# Patient Record
Sex: MALE | Race: BLACK OR AFRICAN AMERICAN | Employment: OTHER | URBAN - METROPOLITAN AREA
[De-identification: names, ages, dates, MRNs, and addresses within clinical notes are randomized per-mention and may not be internally consistent; named-entity substitution may affect disease eponyms.]

---

## 2017-07-10 ENCOUNTER — HOSPITAL ENCOUNTER (OUTPATIENT)
Age: 77
Discharge: HOME | End: 2017-07-10
Payer: MEDICARE

## 2017-07-10 VITALS
OXYGEN SATURATION: 100 % | TEMPERATURE: 98 F | WEIGHT: 170 LBS | RESPIRATION RATE: 18 BRPM | HEART RATE: 74 BPM | DIASTOLIC BLOOD PRESSURE: 62 MMHG | BODY MASS INDEX: 24.34 KG/M2 | SYSTOLIC BLOOD PRESSURE: 152 MMHG | HEIGHT: 70 IN

## 2017-07-10 DIAGNOSIS — J02.9 PHARYNGITIS, UNSPECIFIED ETIOLOGY: Primary | ICD-10-CM

## 2017-07-10 DIAGNOSIS — K13.79 LESION OF SOFT PALATE: ICD-10-CM

## 2017-07-10 LAB — POCT RAPID STREP: NEGATIVE

## 2017-07-10 PROCEDURE — 99214 OFFICE O/P EST MOD 30 MIN: CPT

## 2017-07-10 PROCEDURE — 87081 CULTURE SCREEN ONLY: CPT | Performed by: PHYSICIAN ASSISTANT

## 2017-07-10 PROCEDURE — 87430 STREP A AG IA: CPT | Performed by: PHYSICIAN ASSISTANT

## 2017-07-10 RX ORDER — DEXAMETHASONE SODIUM PHOSPHATE 4 MG/ML
8 VIAL (ML) INJECTION ONCE
Status: COMPLETED | OUTPATIENT
Start: 2017-07-10 | End: 2017-07-10

## 2017-07-10 NOTE — ED PROVIDER NOTES
Patient Seen in: [facility]    History   No chief complaint on file. Stated Complaint: 4 DAYS SORE THROAT    HPI    Elieser Bragg is a 64-year-old gentleman who presents today for evaluation of sore throat.   He has past medical hist other systems reviewed and negative except as noted above. PSFH elements reviewed from today and agreed except as otherwise stated in HPI.     Physical Exam   ED Triage Vitals [07/10/17 1548]  BP: 152/62  Pulse: 74  Resp: 18  Temp: 97.7 °F (36.5 °C)  Tem throat and some mild hoarseness. His symptoms have been acute in onset without any other constitutional symptoms. On exam, he is well-appearing, no respiratory distress.   Posterior pharynx is mildly erythematous, but there is a large, irregularly-shaped below.     Jona LEDESMA

## 2017-10-17 ENCOUNTER — LAB ENCOUNTER (OUTPATIENT)
Dept: LAB | Age: 77
End: 2017-10-17
Attending: UROLOGY
Payer: MEDICARE

## 2017-10-17 DIAGNOSIS — R97.20 ELEVATED PSA: ICD-10-CM

## 2017-10-17 PROCEDURE — 84154 ASSAY OF PSA FREE: CPT

## 2017-10-17 PROCEDURE — 84153 ASSAY OF PSA TOTAL: CPT

## 2017-10-17 PROCEDURE — 36415 COLL VENOUS BLD VENIPUNCTURE: CPT

## 2018-01-16 PROBLEM — R79.89 LOW TSH LEVEL: Status: ACTIVE | Noted: 2018-01-16

## 2018-01-16 PROBLEM — E11.9 TYPE 2 DIABETES MELLITUS WITHOUT COMPLICATION, WITHOUT LONG-TERM CURRENT USE OF INSULIN (HCC): Status: ACTIVE | Noted: 2018-01-16

## 2018-01-16 PROBLEM — N18.9 CHRONIC RENAL IMPAIRMENT: Status: ACTIVE | Noted: 2018-01-16

## 2018-01-16 PROBLEM — R97.20 ELEVATED PSA: Status: ACTIVE | Noted: 2018-01-16

## 2018-01-16 PROBLEM — D64.9 ANEMIA: Status: ACTIVE | Noted: 2018-01-16

## 2018-01-16 PROBLEM — R63.4 WEIGHT LOSS, ABNORMAL: Status: ACTIVE | Noted: 2018-01-16

## 2018-01-16 PROBLEM — Z86.010 PERSONAL HISTORY OF COLONIC POLYPS: Status: ACTIVE | Noted: 2018-01-16

## 2018-05-18 ENCOUNTER — LABORATORY ENCOUNTER (OUTPATIENT)
Dept: LAB | Age: 78
End: 2018-05-18
Payer: MEDICARE

## 2018-05-18 DIAGNOSIS — K55.20 ANGIODYSPLASIA: ICD-10-CM

## 2018-05-18 DIAGNOSIS — D64.9 ANEMIA, UNSPECIFIED TYPE: ICD-10-CM

## 2018-05-18 DIAGNOSIS — D50.9 IRON DEFICIENCY ANEMIA, UNSPECIFIED IRON DEFICIENCY ANEMIA TYPE: ICD-10-CM

## 2018-05-18 DIAGNOSIS — R63.4 ABNORMAL WEIGHT LOSS: ICD-10-CM

## 2018-05-18 PROCEDURE — 80048 BASIC METABOLIC PNL TOTAL CA: CPT

## 2018-05-18 PROCEDURE — 36415 COLL VENOUS BLD VENIPUNCTURE: CPT

## 2018-05-18 PROCEDURE — 85025 COMPLETE CBC W/AUTO DIFF WBC: CPT

## 2018-07-03 ENCOUNTER — ANESTHESIA (OUTPATIENT)
Dept: ENDOSCOPY | Facility: HOSPITAL | Age: 78
End: 2018-07-03
Payer: MEDICARE

## 2018-07-03 ENCOUNTER — SURGERY (OUTPATIENT)
Age: 78
End: 2018-07-03

## 2018-07-03 ENCOUNTER — HOSPITAL ENCOUNTER (OUTPATIENT)
Facility: HOSPITAL | Age: 78
Setting detail: HOSPITAL OUTPATIENT SURGERY
Discharge: HOME | End: 2018-07-03
Attending: INTERNAL MEDICINE | Admitting: INTERNAL MEDICINE
Payer: MEDICARE

## 2018-07-03 ENCOUNTER — ANESTHESIA EVENT (OUTPATIENT)
Dept: ENDOSCOPY | Facility: HOSPITAL | Age: 78
End: 2018-07-03
Payer: MEDICARE

## 2018-07-03 VITALS
RESPIRATION RATE: 18 BRPM | DIASTOLIC BLOOD PRESSURE: 61 MMHG | HEART RATE: 74 BPM | HEIGHT: 69 IN | SYSTOLIC BLOOD PRESSURE: 154 MMHG | WEIGHT: 165 LBS | OXYGEN SATURATION: 100 % | BODY MASS INDEX: 24.44 KG/M2 | TEMPERATURE: 98 F

## 2018-07-03 DIAGNOSIS — K55.20 ANGIODYSPLASIA: ICD-10-CM

## 2018-07-03 LAB — GLUCOSE BLD-MCNC: 110 MG/DL (ref 65–99)

## 2018-07-03 PROCEDURE — 82962 GLUCOSE BLOOD TEST: CPT

## 2018-07-03 PROCEDURE — 0DJ08ZZ INSPECTION OF UPPER INTESTINAL TRACT, VIA NATURAL OR ARTIFICIAL OPENING ENDOSCOPIC: ICD-10-PCS | Performed by: INTERNAL MEDICINE

## 2018-07-03 RX ORDER — SODIUM CHLORIDE, SODIUM LACTATE, POTASSIUM CHLORIDE, CALCIUM CHLORIDE 600; 310; 30; 20 MG/100ML; MG/100ML; MG/100ML; MG/100ML
INJECTION, SOLUTION INTRAVENOUS CONTINUOUS
Status: DISCONTINUED | OUTPATIENT
Start: 2018-07-03 | End: 2018-07-03

## 2018-07-03 RX ORDER — DEXTROSE MONOHYDRATE 25 G/50ML
50 INJECTION, SOLUTION INTRAVENOUS
Status: DISCONTINUED | OUTPATIENT
Start: 2018-07-03 | End: 2018-07-03

## 2018-07-03 NOTE — OPERATIVE REPORT
Edith Fredericvikram Patient Status:  Hospital Outpatient Surgery    1940 MRN [id number]   [facility] ENDOSCOPY Attending Raquel Crowley MD   Hosp Day # 0 PCP Isha Huntley MD     PREOPERATIVE DIAGNOSIS/INDICATION: H/o gastroduodenal

## 2018-07-03 NOTE — ANESTHESIA POSTPROCEDURE EVALUATION
2505 [facility] Patient Status:  Hospital Outpatient Surgery   Age/Gender [de-identified]year old male MRN [id number]   Location [address] [facility]. Attending Nestor Rice MD   Hosp Day # 0 PCP Kunal Agrawal MD       Anesthesia Post-op

## 2018-07-03 NOTE — ANESTHESIA PREPROCEDURE EVALUATION
PRE-OP EVALUATION    Patient Name: Ellie Kirkpatrick    Pre-op Diagnosis: Angiodysplasia [K55.20]    Procedure(s):  ESOPHAGOGASTRODUODENOSCOPY    Surgeon(s) and Role:     Dominique Tavarez MD - Primary    Pre-op vitals reviewed.   Temp: 98 °F (36.7 °C)  Puls GI/Hepatic/Renal      (+) GERD                           Cardiovascular                  (+) hypertension                                     Endo/Other      (+) diabetes         (+) anemia                   Pulmonary                           Neuro/Psych

## 2018-07-03 NOTE — H&P
[id number] Radha Lay Patient Status:  Hospital Outpatient Surgery    1940 MRN [id number]   [facility] ENDOSCOPY Attending Sp Eason MD   Hosp Day # 0 PCP Cristiano Wright MD     CC: stomach and duod source Oral, resp. rate 20, height 69\", weight 165 lb, SpO2 100 %. General: Appears alert, oriented x3 and in no acute distress. HEENT: Normal.  CV: S1 and S2 normal.    Lungs: Clear to auscultation. Abdomen: Soft and nondistended. Nontender.   No ma

## 2018-09-27 ENCOUNTER — PRIOR ORIGINAL RECORDS (OUTPATIENT)
Dept: OTHER | Age: 78
End: 2018-09-27

## 2018-10-12 ENCOUNTER — HOSPITAL ENCOUNTER (EMERGENCY)
Age: 78
Discharge: STILL ADMITTED OR ADMINISTRATIVE | End: 2018-10-12
Payer: MEDICARE

## 2018-10-12 ENCOUNTER — HOSPITAL ENCOUNTER (OUTPATIENT)
Dept: CT IMAGING | Age: 78
Discharge: HOME | End: 2018-10-12
Attending: SPECIALIST
Payer: MEDICARE

## 2018-10-12 DIAGNOSIS — R63.0 LOSS OF APPETITE: ICD-10-CM

## 2018-10-12 DIAGNOSIS — R63.4 WEIGHT LOSS: ICD-10-CM

## 2018-10-12 DIAGNOSIS — R52 PAIN: ICD-10-CM

## 2018-10-12 PROCEDURE — 74176 CT ABD & PELVIS W/O CONTRAST: CPT | Performed by: SPECIALIST

## 2018-10-19 ENCOUNTER — TELEPHONE (OUTPATIENT)
Dept: SURGERY | Facility: CLINIC | Age: 78
End: 2018-10-19

## 2018-10-19 NOTE — TELEPHONE ENCOUNTER
Called pt to review HX prior to appt on Monday 10/22/18; was told pt is unavailable and to call back later. Called pt again; pt is currently driving and will be for a while. Advised we would call him back on Monday morning.   Pt states he will be avai

## 2018-10-22 ENCOUNTER — OFFICE VISIT (OUTPATIENT)
Dept: SURGERY | Facility: CLINIC | Age: 78
End: 2018-10-22
Payer: COMMERCIAL

## 2018-10-22 ENCOUNTER — NURSE ONLY (OUTPATIENT)
Dept: HEMATOLOGY/ONCOLOGY | Facility: HOSPITAL | Age: 78
End: 2018-10-22
Attending: SURGERY
Payer: MEDICARE

## 2018-10-22 VITALS
TEMPERATURE: 98 F | DIASTOLIC BLOOD PRESSURE: 75 MMHG | BODY MASS INDEX: 23.11 KG/M2 | WEIGHT: 156 LBS | HEART RATE: 88 BPM | RESPIRATION RATE: 17 BRPM | HEIGHT: 69 IN | SYSTOLIC BLOOD PRESSURE: 163 MMHG | OXYGEN SATURATION: 100 %

## 2018-10-22 DIAGNOSIS — K86.89 PANCREATIC MASS: Primary | ICD-10-CM

## 2018-10-22 DIAGNOSIS — K86.89 PANCREATIC MASS: ICD-10-CM

## 2018-10-22 PROCEDURE — 80053 COMPREHEN METABOLIC PANEL: CPT

## 2018-10-22 PROCEDURE — 36415 COLL VENOUS BLD VENIPUNCTURE: CPT

## 2018-10-22 PROCEDURE — 86301 IMMUNOASSAY TUMOR CA 19-9: CPT

## 2018-10-22 PROCEDURE — 99205 OFFICE O/P NEW HI 60 MIN: CPT | Performed by: SURGERY

## 2018-10-22 PROCEDURE — 85025 COMPLETE CBC W/AUTO DIFF WBC: CPT

## 2018-10-22 RX ORDER — SITAGLIPTIN 100 MG/1
TABLET, FILM COATED ORAL
Refills: 0 | COMMUNITY
Start: 2018-10-20 | End: 2020-02-15 | Stop reason: CLARIF

## 2018-10-22 RX ORDER — SITAGLIPTIN AND METFORMIN HYDROCHLORIDE 50; 1000 MG/1; MG/1
TABLET, FILM COATED ORAL
Refills: 0 | COMMUNITY
Start: 2018-10-01 | End: 2020-02-15 | Stop reason: ALTCHOICE

## 2018-10-22 RX ORDER — GLIPIZIDE 10 MG/1
TABLET ORAL
Refills: 0 | COMMUNITY
Start: 2018-09-11 | End: 2019-03-15

## 2018-10-22 RX ORDER — HYDROXYZINE HYDROCHLORIDE 10 MG/1
TABLET, FILM COATED ORAL
Refills: 0 | COMMUNITY
Start: 2018-06-23 | End: 2019-03-15

## 2018-10-22 RX ORDER — FENOFIBRIC ACID 135 MG/1
CAPSULE, DELAYED RELEASE ORAL
Refills: 0 | COMMUNITY
Start: 2018-08-06 | End: 2020-02-15 | Stop reason: CLARIF

## 2018-10-22 RX ORDER — LISINOPRIL 20 MG/1
TABLET ORAL
Refills: 0 | COMMUNITY
Start: 2018-10-12 | End: 2020-02-15 | Stop reason: CLARIF

## 2018-10-22 RX ORDER — AMLODIPINE BESYLATE 5 MG/1
TABLET ORAL
Refills: 0 | COMMUNITY
Start: 2018-08-17 | End: 2020-02-15 | Stop reason: CLARIF

## 2018-10-22 RX ORDER — INSULIN GLARGINE 300 U/ML
INJECTION, SOLUTION SUBCUTANEOUS
Refills: 1 | COMMUNITY
Start: 2018-09-12 | End: 2020-02-15 | Stop reason: ALTCHOICE

## 2018-10-22 RX ORDER — PEN NEEDLE, DIABETIC 32GX 5/32"
NEEDLE, DISPOSABLE MISCELLANEOUS
Refills: 0 | COMMUNITY
Start: 2018-09-28 | End: 2020-02-15 | Stop reason: CLARIF

## 2018-10-22 NOTE — CONSULTS
[facility]    Patient Name:  Ted Olivo   YOB: 1940   Gender:  Male   Appt Date:  10/22/2018   Provider:  Kamran Egan MD   Insurance:  [employer]ther Hong MEDICARE [address]  Referring and Primary Care Provid U/F 32G X 4 MM Does not apply Misc, USE QD, Disp: , Rfl: 0  •  TOUJEO SOLOSTAR 300 UNIT/ML Subcutaneous Solution Pen-injector, INJECT   30  UNITS  SC    ONCE  DAILY, Disp: , Rfl: 1  •  HydrOXYzine HCl 10 MG Oral Tab, TAKE 1 OR 2 TABLETS PO QHS, Disp: , Rfl Reviewed:  Past Surgical History:   Procedure Laterality Date   • Colonoscopy     • Egd     • Other      Jaw Hinge Joint Repair - Adolescence   • Other surgical history      incision and drainage and skin graft       Reviewed Social History:  Social Hist an underlying infiltrating mass lesion measuring 6.1 x 6.1 x 3.8 cm. There is relative atrophy of the pancreatic body and tail. 2. Mild scattered diverticular changes of the descending and sigmoid colon without acute diverticulitis or colitis.   3. There

## 2018-10-25 ENCOUNTER — ANESTHESIA EVENT (OUTPATIENT)
Dept: ENDOSCOPY | Facility: HOSPITAL | Age: 78
End: 2018-10-25

## 2018-10-25 ENCOUNTER — HOSPITAL ENCOUNTER (OUTPATIENT)
Facility: HOSPITAL | Age: 78
Setting detail: HOSPITAL OUTPATIENT SURGERY
Discharge: HOME | End: 2018-10-25
Attending: INTERNAL MEDICINE | Admitting: INTERNAL MEDICINE
Payer: MEDICARE

## 2018-10-25 ENCOUNTER — ANESTHESIA (OUTPATIENT)
Dept: ENDOSCOPY | Facility: HOSPITAL | Age: 78
End: 2018-10-25

## 2018-10-25 VITALS
WEIGHT: 160 LBS | HEIGHT: 70 IN | OXYGEN SATURATION: 100 % | DIASTOLIC BLOOD PRESSURE: 76 MMHG | SYSTOLIC BLOOD PRESSURE: 159 MMHG | BODY MASS INDEX: 22.9 KG/M2 | RESPIRATION RATE: 16 BRPM | HEART RATE: 70 BPM | TEMPERATURE: 98 F

## 2018-10-25 DIAGNOSIS — K86.89 MASS OF PANCREAS: ICD-10-CM

## 2018-10-25 PROCEDURE — 0DJ08ZZ INSPECTION OF UPPER INTESTINAL TRACT, VIA NATURAL OR ARTIFICIAL OPENING ENDOSCOPIC: ICD-10-PCS | Performed by: INTERNAL MEDICINE

## 2018-10-25 PROCEDURE — 82962 GLUCOSE BLOOD TEST: CPT

## 2018-10-25 RX ORDER — NALOXONE HYDROCHLORIDE 0.4 MG/ML
80 INJECTION, SOLUTION INTRAMUSCULAR; INTRAVENOUS; SUBCUTANEOUS AS NEEDED
Status: DISCONTINUED | OUTPATIENT
Start: 2018-10-25 | End: 2018-10-25

## 2018-10-25 RX ORDER — DEXTROSE MONOHYDRATE 25 G/50ML
50 INJECTION, SOLUTION INTRAVENOUS
Status: DISCONTINUED | OUTPATIENT
Start: 2018-10-25 | End: 2018-10-25

## 2018-10-25 RX ORDER — SODIUM CHLORIDE, SODIUM LACTATE, POTASSIUM CHLORIDE, CALCIUM CHLORIDE 600; 310; 30; 20 MG/100ML; MG/100ML; MG/100ML; MG/100ML
INJECTION, SOLUTION INTRAVENOUS CONTINUOUS
Status: DISCONTINUED | OUTPATIENT
Start: 2018-10-25 | End: 2018-10-25

## 2018-10-25 RX ORDER — MORPHINE SULFATE 4 MG/ML
2 INJECTION, SOLUTION INTRAMUSCULAR; INTRAVENOUS EVERY 5 MIN PRN
Status: DISCONTINUED | OUTPATIENT
Start: 2018-10-25 | End: 2018-10-25

## 2018-10-25 NOTE — ANESTHESIA PREPROCEDURE EVALUATION
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   Temp: 98.1 °F (36.7 °C)  Pulse: Units by mouth daily. Disp:  Rfl:    vitamin E 400 UNITS Oral Cap Take 400 Units by mouth daily. Disp:  Rfl:    Fexofenadine HCl (ALLEGRA OR) as needed.    Disp:  Rfl:        Allergies: Bactrim [Sulfamethoxazole W/Trimethoprim]      Anesthesia Evaluation 10/22/2018     10/22/2018    CO2 25.0 10/22/2018    BUN 26 (H) 10/22/2018    CREATSERUM 1.75 (H) 10/22/2018     (H) 10/22/2018    CA 9.6 10/22/2018            Airway      Mallampati: II  Mouth opening: 3 FB  TM distance: 4 - 6 cm  Neck ROM: fu

## 2018-10-25 NOTE — OPERATIVE REPORT
Jana Crouch Patient Status:  Hospital Outpatient Surgery    1940 MRN [id number]   [facility] ENDOSCOPY Attending Rich Johansen MD   Hosp Day # 0 PCP Kunal Dozier MD     PREOPERATIVE DIAGNOSIS/INDICATION: Weight loss.  Abnor condition. FINDINGS:  - DUODENUM: Normal  - ECHO: Imaging was performed through the esophagus, stomach and duodenum.  The subcarinal space and the aortopulmonary window appear wnl, the celiac axis and the left adrenal gland appear wnl, the visualized porti

## 2018-10-25 NOTE — ANESTHESIA POSTPROCEDURE EVALUATION
2505 [facility] Patient Status:  Hospital Outpatient Surgery   Age/Gender [de-identified]year old male MRN [id number]   Location [address] [place] [address]. Attending Mariza Ndiaye MD   Hosp Day # 0 PCP Padamjit Loreta Nageotte, MD       Anesthesia Post-op

## 2018-10-25 NOTE — H&P
[address] Radha Lay Patient Status:  Hospital Outpatient Surgery    1940 MRN [id number]   [facility] ENDOSCOPY Attending Brittany Doyle MD   Hosp Day # 0 PCP Seng Bain MD     CC: Abnormal CT PRN    Physical Exam:    Blood pressure 140/66, pulse 65, temperature 98.1 °F (36.7 °C), temperature source Oral, resp. rate 20, height 70\", weight 160 lb, SpO2 100 %. General: Appears alert, oriented x3 and in no acute distress.   HEENT: Normal.  CV: S

## 2018-10-26 ENCOUNTER — HOSPITAL ENCOUNTER (OUTPATIENT)
Dept: CT IMAGING | Facility: HOSPITAL | Age: 78
Discharge: HOME | End: 2018-10-26
Attending: PHYSICIAN ASSISTANT
Payer: MEDICARE

## 2018-10-26 ENCOUNTER — TELEPHONE (OUTPATIENT)
Dept: SURGERY | Facility: CLINIC | Age: 78
End: 2018-10-26

## 2018-10-26 ENCOUNTER — HOSPITAL ENCOUNTER (OUTPATIENT)
Dept: ULTRASOUND IMAGING | Facility: HOSPITAL | Age: 78
Discharge: HOME | End: 2018-10-26
Attending: SPECIALIST
Payer: MEDICARE

## 2018-10-26 DIAGNOSIS — E04.2 MULTIPLE THYROID NODULES: ICD-10-CM

## 2018-10-26 DIAGNOSIS — K86.89 PANCREATIC MASS: ICD-10-CM

## 2018-10-26 PROCEDURE — 10022 US FNA THYROID (CPT=10022/76942): CPT | Performed by: SPECIALIST

## 2018-10-26 PROCEDURE — 74170 CT ABD WO CNTRST FLWD CNTRST: CPT | Performed by: PHYSICIAN ASSISTANT

## 2018-10-26 PROCEDURE — 76942 ECHO GUIDE FOR BIOPSY: CPT | Performed by: SPECIALIST

## 2018-10-26 PROCEDURE — 88173 CYTOPATH EVAL FNA REPORT: CPT | Performed by: SPECIALIST

## 2018-10-26 NOTE — TELEPHONE ENCOUNTER
Informed pt that so far his testing has not revealed any distinct findings. Dr. Goldman Roles would like to re-present his case at next week's GITB to discuss POC. Patient verbalized understanding of plan and will await the recommendations of the GITB.

## 2018-10-26 NOTE — PROGRESS NOTES
PROCEDURE: US FNA THYROID (CPT=10022/03668)    COMPARISON: None.     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

## 2018-10-31 ENCOUNTER — TELEPHONE (OUTPATIENT)
Dept: SURGERY | Facility: CLINIC | Age: 78
End: 2018-10-31

## 2018-10-31 DIAGNOSIS — R93.2 ABNORMAL GALL BLADDER DIAGNOSTIC IMAGING: Primary | ICD-10-CM

## 2018-10-31 NOTE — TELEPHONE ENCOUNTER
Informed patient that our GI TB recommends follow up of gall bladder via abdominal u/s. Order placed and central scheduling number given to patient. Patient expressed concern regarding foam in his urine and awaiting call from Dr. Peter Gonsalez 's office.  Informed

## 2018-11-05 ENCOUNTER — TELEPHONE (OUTPATIENT)
Dept: SURGERY | Facility: CLINIC | Age: 78
End: 2018-11-05

## 2018-11-05 NOTE — TELEPHONE ENCOUNTER
BABSMM to follow up regarding scheduling his abdominal u/s and if he was able to f/u with Dr. Marcos Redmond office regarding the foam in his urine. Callback number left for patient.

## 2018-11-15 ENCOUNTER — TELEPHONE (OUTPATIENT)
Dept: SURGERY | Facility: CLINIC | Age: 78
End: 2018-11-15

## 2018-11-15 NOTE — TELEPHONE ENCOUNTER
Call to pt to follow up if he had an office visit with Dr. Sona Zaidi regarding the foam in his urine and if he will be scheduling the abdomen U/S. LVMM to call back.

## 2019-01-07 ENCOUNTER — PRIOR ORIGINAL RECORDS (OUTPATIENT)
Dept: OTHER | Age: 79
End: 2019-01-07

## 2019-03-21 ENCOUNTER — HOSPITAL (OUTPATIENT)
Dept: OTHER | Age: 79
End: 2019-03-21
Attending: RADIOLOGY

## 2019-03-21 LAB
GLUCOSE BLDC GLUCOMTR-MCNC: 76 MG/DL (ref 65–99)
GLUCOSE BLDC GLUCOMTR-MCNC: 76 MG/DL (ref 65–99)
GLUCOSE BLDC GLUCOMTR-MCNC: NORMAL MG/DL
INR PPP: 1
INR PPP: 1
INR PPP: NORMAL
PROTHROMBIN TIME (PRT2): NORMAL
PROTHROMBIN TIME: 10.7 SEC (ref 9.7–11.8)
PROTHROMBIN TIME: 10.7 SECONDS (ref 9.7–11.8)
PROTHROMBIN TIME: NORMAL

## 2019-03-22 RX ORDER — SODIUM CHLORIDE, SODIUM LACTATE, POTASSIUM CHLORIDE, CALCIUM CHLORIDE 600; 310; 30; 20 MG/100ML; MG/100ML; MG/100ML; MG/100ML
INJECTION, SOLUTION INTRAVENOUS CONTINUOUS
Status: CANCELLED | OUTPATIENT
Start: 2019-03-22

## 2019-03-28 LAB — PATHOLOGIST NAME: NORMAL

## 2019-03-29 ENCOUNTER — HOSPITAL ENCOUNTER (OUTPATIENT)
Facility: HOSPITAL | Age: 79
Setting detail: HOSPITAL OUTPATIENT SURGERY
Discharge: HOME | End: 2019-03-29
Attending: INTERNAL MEDICINE | Admitting: INTERNAL MEDICINE
Payer: MEDICARE

## 2019-03-29 VITALS
BODY MASS INDEX: 24.34 KG/M2 | TEMPERATURE: 99 F | WEIGHT: 170 LBS | RESPIRATION RATE: 16 BRPM | HEART RATE: 56 BPM | HEIGHT: 70 IN | SYSTOLIC BLOOD PRESSURE: 152 MMHG | DIASTOLIC BLOOD PRESSURE: 54 MMHG | OXYGEN SATURATION: 99 %

## 2019-03-29 DIAGNOSIS — D64.9 ANEMIA, UNSPECIFIED TYPE: ICD-10-CM

## 2019-03-29 PROCEDURE — 83550 IRON BINDING TEST: CPT | Performed by: INTERNAL MEDICINE

## 2019-03-29 PROCEDURE — 82728 ASSAY OF FERRITIN: CPT | Performed by: INTERNAL MEDICINE

## 2019-03-29 PROCEDURE — 0W3P8ZZ CONTROL BLEEDING IN GASTROINTESTINAL TRACT, VIA NATURAL OR ARTIFICIAL OPENING ENDOSCOPIC: ICD-10-PCS | Performed by: INTERNAL MEDICINE

## 2019-03-29 PROCEDURE — 83540 ASSAY OF IRON: CPT | Performed by: INTERNAL MEDICINE

## 2019-03-29 PROCEDURE — 82962 GLUCOSE BLOOD TEST: CPT

## 2019-03-29 PROCEDURE — 85025 COMPLETE CBC W/AUTO DIFF WBC: CPT | Performed by: INTERNAL MEDICINE

## 2019-03-29 RX ORDER — SODIUM CHLORIDE, SODIUM LACTATE, POTASSIUM CHLORIDE, CALCIUM CHLORIDE 600; 310; 30; 20 MG/100ML; MG/100ML; MG/100ML; MG/100ML
INJECTION, SOLUTION INTRAVENOUS CONTINUOUS
Status: DISCONTINUED | OUTPATIENT
Start: 2019-03-29 | End: 2019-03-29

## 2019-03-29 RX ORDER — DEXTROSE MONOHYDRATE 25 G/50ML
50 INJECTION, SOLUTION INTRAVENOUS
Status: DISCONTINUED | OUTPATIENT
Start: 2019-03-29 | End: 2019-03-29

## 2019-03-29 RX ORDER — ONDANSETRON 2 MG/ML
4 INJECTION INTRAMUSCULAR; INTRAVENOUS AS NEEDED
Status: DISCONTINUED | OUTPATIENT
Start: 2019-03-29 | End: 2019-03-29

## 2019-03-29 RX ORDER — NALOXONE HYDROCHLORIDE 0.4 MG/ML
80 INJECTION, SOLUTION INTRAMUSCULAR; INTRAVENOUS; SUBCUTANEOUS AS NEEDED
Status: DISCONTINUED | OUTPATIENT
Start: 2019-03-29 | End: 2019-03-29

## 2019-03-29 NOTE — H&P
History & Physical Examination    Patient Name: Pham Gilman  MRN: [id number]  [facility]: [id number]  YOB: 1940    Diagnosis: anemia, history of gastric avm's    Present Illness: 78 y/o M history as above presents for EGD.        Medications Prior to Units by mouth daily. Disp:  Rfl:  Past Week at Unknown time   vitamin E 400 UNITS Oral Cap Take 400 Units by mouth daily.  Disp:  Rfl:  3/28/2019 at Unknown time       Current Facility-Administered Medications:  lactated ringers infusion  Intravenous Daryn Blakely Years since quittin.2      Smokeless tobacco: Never Used    Alcohol use: No      SYSTEM Check if Review is Normal Check if Physical Exam is Normal If not normal, please explain:   HEENT [ x] [x ]    NECK & BACK [ x] [ x]    HEART [ x] [x ]    LUNGS

## 2019-03-29 NOTE — OPERATIVE REPORT
Iona Rosen Patient Status:  Hospital Outpatient Surgery    1940 MRN [id number]   [facility] ENDOSCOPY Attending Sandra Carranza,    Hosp Day # 0 PCP Kunal Estrella MD       PREOPERATIVE DIAGNOSIS/INDICATION: Anemia, h using argon plasma coagulation. Three additional non-bleeding AVM's visualized in the 1st/2nd portion of the duodenum s/p ablation using APC. One ablated duodenal AVM was closed using a hemoclip. IMPRESSION:   1.  Six AVM's were visualized (four duoden

## 2019-05-29 ENCOUNTER — PRIOR ORIGINAL RECORDS (OUTPATIENT)
Dept: OTHER | Age: 79
End: 2019-05-29

## 2020-02-15 ENCOUNTER — HOSPITAL ENCOUNTER (OUTPATIENT)
Age: 80
Discharge: ANOTHER HOSPITAL | DRG: 372 | End: 2020-02-15
Attending: FAMILY MEDICINE
Payer: MEDICARE

## 2020-02-15 ENCOUNTER — HOSPITAL ENCOUNTER (INPATIENT)
Facility: HOSPITAL | Age: 80
LOS: 4 days | Discharge: HOME | DRG: 372 | End: 2020-02-19
Attending: EMERGENCY MEDICINE | Admitting: SPECIALIST
Payer: MEDICARE

## 2020-02-15 ENCOUNTER — APPOINTMENT (OUTPATIENT)
Dept: GENERAL RADIOLOGY | Age: 80
DRG: 372 | End: 2020-02-15
Attending: FAMILY MEDICINE
Payer: MEDICARE

## 2020-02-15 VITALS
HEART RATE: 98 BPM | TEMPERATURE: 103 F | SYSTOLIC BLOOD PRESSURE: 171 MMHG | OXYGEN SATURATION: 99 % | DIASTOLIC BLOOD PRESSURE: 68 MMHG | RESPIRATION RATE: 24 BRPM

## 2020-02-15 DIAGNOSIS — Z79.4 TYPE 2 DIABETES MELLITUS WITH HYPERGLYCEMIA, WITH LONG-TERM CURRENT USE OF INSULIN (HCC): ICD-10-CM

## 2020-02-15 DIAGNOSIS — E11.65 TYPE 2 DIABETES MELLITUS WITH HYPERGLYCEMIA, WITH LONG-TERM CURRENT USE OF INSULIN (HCC): ICD-10-CM

## 2020-02-15 DIAGNOSIS — K52.9 GASTROENTERITIS: ICD-10-CM

## 2020-02-15 DIAGNOSIS — M54.2 NECK PAIN: ICD-10-CM

## 2020-02-15 DIAGNOSIS — D72.825 BANDEMIA: ICD-10-CM

## 2020-02-15 DIAGNOSIS — R53.83 FATIGUE, UNSPECIFIED TYPE: Primary | ICD-10-CM

## 2020-02-15 DIAGNOSIS — N17.9 ACUTE KIDNEY INJURY (HCC): Primary | ICD-10-CM

## 2020-02-15 DIAGNOSIS — R19.7 NAUSEA VOMITING AND DIARRHEA: ICD-10-CM

## 2020-02-15 DIAGNOSIS — E86.0 DEHYDRATION: ICD-10-CM

## 2020-02-15 DIAGNOSIS — R11.2 NAUSEA VOMITING AND DIARRHEA: ICD-10-CM

## 2020-02-15 PROBLEM — R73.9 HYPERGLYCEMIA: Status: ACTIVE | Noted: 2020-02-15

## 2020-02-15 PROBLEM — E87.5 HYPERKALEMIA: Status: ACTIVE | Noted: 2020-02-15

## 2020-02-15 PROBLEM — R10.9 ABDOMINAL PAIN: Status: ACTIVE | Noted: 2020-02-15

## 2020-02-15 LAB
#MXD IC: 0.6 X10ˆ3/UL (ref 0.1–1)
ALBUMIN SERPL-MCNC: 2.7 G/DL (ref 3.4–5)
ALBUMIN/GLOB SERPL: 0.6 {RATIO} (ref 1–2)
ALP LIVER SERPL-CCNC: 37 U/L (ref 45–117)
ALT SERPL-CCNC: 22 U/L (ref 16–61)
ANION GAP SERPL CALC-SCNC: 3 MMOL/L (ref 0–18)
AST SERPL-CCNC: 21 U/L (ref 15–37)
BILIRUB SERPL-MCNC: 0.3 MG/DL (ref 0.1–2)
BILIRUB UR QL STRIP.AUTO: NEGATIVE
BUN BLD-MCNC: 33 MG/DL (ref 7–18)
BUN/CREAT SERPL: 10.6 (ref 10–20)
CALCIUM BLD-MCNC: 7.8 MG/DL (ref 8.5–10.1)
CHLORIDE SERPL-SCNC: 114 MMOL/L (ref 98–112)
CO2 SERPL-SCNC: 22 MMOL/L (ref 21–32)
COLOR UR AUTO: YELLOW
CREAT BLD-MCNC: 3.1 MG/DL (ref 0.7–1.3)
GLOBULIN PLAS-MCNC: 4.7 G/DL (ref 2.8–4.4)
GLUCOSE BLD-MCNC: 106 MG/DL (ref 70–99)
GLUCOSE BLD-MCNC: 144 MG/DL (ref 70–99)
GLUCOSE BLD-MCNC: 173 MG/DL (ref 70–99)
GLUCOSE UR STRIP.AUTO-MCNC: 50 MG/DL
HCT VFR BLD AUTO: 29.1 % (ref 39–53)
HGB BLD-MCNC: 9.1 G/DL (ref 13–17.5)
HYALINE CASTS #/AREA URNS AUTO: PRESENT /LPF
ISTAT TROPONIN: <0.1 NG/ML (ref ?–0.1)
KETONES UR STRIP.AUTO-MCNC: NEGATIVE MG/DL
LEUKOCYTE ESTERASE UR QL STRIP.AUTO: NEGATIVE
LYMPHOCYTES # BLD AUTO: 0.3 X10ˆ3/UL (ref 1–4)
LYMPHOCYTES NFR BLD AUTO: 2.1 %
M PROTEIN MFR SERPL ELPH: 7.4 G/DL (ref 6.4–8.2)
MCH RBC QN AUTO: 28.7 PG (ref 26–34)
MCHC RBC AUTO-ENTMCNC: 31.3 G/DL (ref 31–37)
MCV RBC AUTO: 91.8 FL (ref 80–100)
MIXED CELL %: 4.2 %
NEUTROPHILS # BLD AUTO: 13.6 X10ˆ3/UL (ref 1.5–7.7)
NEUTROPHILS NFR BLD AUTO: 93.7 %
NITRITE UR QL STRIP.AUTO: NEGATIVE
OSMOLALITY SERPL CALC.SUM OF ELEC: 298 MOSM/KG (ref 275–295)
PH UR STRIP.AUTO: 5 [PH] (ref 4.5–8)
PLATELET # BLD AUTO: 344 X10ˆ3/UL (ref 150–450)
POCT INFLUENZA A: NEGATIVE
POCT INFLUENZA B: NEGATIVE
POTASSIUM SERPL-SCNC: 5.3 MMOL/L (ref 3.5–5.1)
PROT UR STRIP.AUTO-MCNC: >=500 MG/DL
RBC # BLD AUTO: 3.17 X10ˆ6/UL (ref 3.8–5.8)
SODIUM SERPL-SCNC: 139 MMOL/L (ref 136–145)
SP GR UR STRIP.AUTO: 1.02 (ref 1–1.03)
UROBILINOGEN UR STRIP.AUTO-MCNC: <2 MG/DL
WBC # BLD AUTO: 14.5 X10ˆ3/UL (ref 4–11)

## 2020-02-15 PROCEDURE — 93005 ELECTROCARDIOGRAM TRACING: CPT

## 2020-02-15 PROCEDURE — 87502 INFLUENZA DNA AMP PROBE: CPT | Performed by: FAMILY MEDICINE

## 2020-02-15 PROCEDURE — 99223 1ST HOSP IP/OBS HIGH 75: CPT | Performed by: HOSPITALIST

## 2020-02-15 PROCEDURE — 84484 ASSAY OF TROPONIN QUANT: CPT

## 2020-02-15 PROCEDURE — 93010 ELECTROCARDIOGRAM REPORT: CPT

## 2020-02-15 PROCEDURE — 99214 OFFICE O/P EST MOD 30 MIN: CPT

## 2020-02-15 PROCEDURE — 82962 GLUCOSE BLOOD TEST: CPT

## 2020-02-15 PROCEDURE — 96374 THER/PROPH/DIAG INJ IV PUSH: CPT

## 2020-02-15 PROCEDURE — 85025 COMPLETE CBC W/AUTO DIFF WBC: CPT | Performed by: FAMILY MEDICINE

## 2020-02-15 RX ORDER — ASPIRIN 81 MG/1
81 TABLET ORAL DAILY
Status: DISCONTINUED | OUTPATIENT
Start: 2020-02-15 | End: 2020-02-19

## 2020-02-15 RX ORDER — ACETAMINOPHEN 500 MG
500 TABLET ORAL ONCE
Status: DISCONTINUED | OUTPATIENT
Start: 2020-02-15 | End: 2020-02-15

## 2020-02-15 RX ORDER — SODIUM CHLORIDE 9 MG/ML
INJECTION, SOLUTION INTRAVENOUS ONCE
Status: COMPLETED | OUTPATIENT
Start: 2020-02-15 | End: 2020-02-15

## 2020-02-15 RX ORDER — ONDANSETRON 2 MG/ML
4 INJECTION INTRAMUSCULAR; INTRAVENOUS ONCE
Status: COMPLETED | OUTPATIENT
Start: 2020-02-15 | End: 2020-02-15

## 2020-02-15 RX ORDER — SODIUM CHLORIDE 9 MG/ML
1000 INJECTION, SOLUTION INTRAVENOUS ONCE
Status: COMPLETED | OUTPATIENT
Start: 2020-02-15 | End: 2020-02-15

## 2020-02-15 RX ORDER — LISINOPRIL 20 MG/1
20 TABLET ORAL DAILY
COMMUNITY
End: 2021-08-23

## 2020-02-15 RX ORDER — HYDROMORPHONE HYDROCHLORIDE 1 MG/ML
0.5 INJECTION, SOLUTION INTRAMUSCULAR; INTRAVENOUS; SUBCUTANEOUS EVERY 30 MIN PRN
Status: ACTIVE | OUTPATIENT
Start: 2020-02-15 | End: 2020-02-15

## 2020-02-15 RX ORDER — SODIUM CHLORIDE 9 MG/ML
INJECTION, SOLUTION INTRAVENOUS CONTINUOUS
Status: ACTIVE | OUTPATIENT
Start: 2020-02-15 | End: 2020-02-15

## 2020-02-15 RX ORDER — METOCLOPRAMIDE HYDROCHLORIDE 5 MG/ML
10 INJECTION INTRAMUSCULAR; INTRAVENOUS EVERY 8 HOURS PRN
Status: DISCONTINUED | OUTPATIENT
Start: 2020-02-15 | End: 2020-02-16

## 2020-02-15 RX ORDER — ONDANSETRON 2 MG/ML
4 INJECTION INTRAMUSCULAR; INTRAVENOUS EVERY 4 HOURS PRN
Status: DISCONTINUED | OUTPATIENT
Start: 2020-02-15 | End: 2020-02-15

## 2020-02-15 RX ORDER — SODIUM CHLORIDE 9 MG/ML
INJECTION, SOLUTION INTRAVENOUS CONTINUOUS
Status: DISCONTINUED | OUTPATIENT
Start: 2020-02-15 | End: 2020-02-16

## 2020-02-15 RX ORDER — ACETAMINOPHEN 500 MG
1000 TABLET ORAL ONCE
Status: COMPLETED | OUTPATIENT
Start: 2020-02-15 | End: 2020-02-15

## 2020-02-15 RX ORDER — BIOTIN 1 MG
1000 TABLET ORAL DAILY
COMMUNITY
End: 2021-08-23

## 2020-02-15 RX ORDER — CHLORAL HYDRATE 500 MG
1000 CAPSULE ORAL DAILY
Status: ON HOLD | COMMUNITY
End: 2020-02-19

## 2020-02-15 RX ORDER — ONDANSETRON 2 MG/ML
4 INJECTION INTRAMUSCULAR; INTRAVENOUS EVERY 6 HOURS PRN
Status: DISCONTINUED | OUTPATIENT
Start: 2020-02-15 | End: 2020-02-19

## 2020-02-15 RX ORDER — DEXTROSE MONOHYDRATE 25 G/50ML
50 INJECTION, SOLUTION INTRAVENOUS
Status: DISCONTINUED | OUTPATIENT
Start: 2020-02-15 | End: 2020-02-19

## 2020-02-15 RX ORDER — AMLODIPINE BESYLATE 5 MG/1
5 TABLET ORAL DAILY
Status: ON HOLD | COMMUNITY
End: 2020-02-19

## 2020-02-15 RX ORDER — MEMANTINE HYDROCHLORIDE 10 MG/1
10 TABLET ORAL DAILY
COMMUNITY

## 2020-02-15 RX ORDER — ACETAMINOPHEN 325 MG/1
650 TABLET ORAL EVERY 6 HOURS PRN
Status: DISCONTINUED | OUTPATIENT
Start: 2020-02-15 | End: 2020-02-19

## 2020-02-15 RX ORDER — HEPARIN SODIUM 5000 [USP'U]/ML
5000 INJECTION, SOLUTION INTRAVENOUS; SUBCUTANEOUS EVERY 12 HOURS SCHEDULED
Status: DISCONTINUED | OUTPATIENT
Start: 2020-02-15 | End: 2020-02-18

## 2020-02-15 NOTE — H&P
[facility]  History and Physical     Digna Serra Patient Status:  Emergency    1940 MRN [id number]   Location [address] [facility] Attending Linsey Avitia MD   Hosp Day # 0 PCP Kiko Keane MD     Chief Complaint: ago. He has a 50.00 pack-year smoking history. He has never used smokeless tobacco. He reports that he does not drink alcohol or use drugs.     Family History:   Family History   Problem Relation Age of Onset   • Diabetes Mother    • Hypertension Mother 155/64   Pulse 84   Temp 98.2 °F (36.8 °C) (Temporal)   Resp 16   Ht 5' 10\" (1.778 m)   Wt 175 lb (79.4 kg)   SpO2 96%   BMI 25.11 kg/m²   General: No acute distress. Alert and oriented x 3. HEENT: Normocephalic atraumatic. Moist mucous membranes.  EOM-I. 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

## 2020-02-15 NOTE — ED PROVIDER NOTES
Patient Seen in: [facility] Emergency Department      History   Patient presents with:  Nausea/Vomiting/Diarrhea    Stated Complaint: sent from [facility] for n/v/diarrhea w/ fever of 102.5    HPI    38-year-old male who presents here to the emerge apartmen Systems    Positive for stated complaint: sent from  for n/v/diarrhea w/ fever of 102.5  Other systems are as noted in HPI. Constitutional and vital signs reviewed. All other systems reviewed and negative except as noted above.     Physical Exam Phosphatase 37 (*)     Albumin 2.7 (*)     Globulin  4.7 (*)     A/G Ratio 0.6 (*)     All other components within normal limits   URINALYSIS WITH CULTURE REFLEX - Abnormal; Notable for the following components:    Clarity Urine Hazy (*)     Glucose Urine

## 2020-02-15 NOTE — ED INITIAL ASSESSMENT (HPI)
Pt to ED from immediate care for NVD and fever od 102.5F since yesterday. Received tylenol at immediate care.

## 2020-02-15 NOTE — ED PROVIDER NOTES
Patient Seen in: [facility]      History   Patient presents with:  Nausea/Vomiting/Diarrhea  Neck Pain    Stated Complaint: VOMITING/NECK PAIN    HPI  47-year-old gentleman with significant past medical history of diabetes alley Used    Alcohol use: No    Drug use: No             Review of Systems    Positive for stated complaint: VOMITING/NECK PAIN  Other systems are as noted in HPI. Constitutional and vital signs reviewed.       All other systems reviewed and negative except as Capillary Refill: Capillary refill takes less than 2 seconds. Neurological:      Mental Status: He is alert and oriented to person, place, and time.                ED Course     Labs Reviewed   POCT CBC - Abnormal; Notable for the following components: diagnosis)  Nausea vomiting and diarrhea  Dehydration  Bandemia  Type 2 diabetes mellitus with hyperglycemia, with long-term current use of insulin (HCC)  Neck pain   Rule out Sepsis      Disposition:   Ic to ed  2/15/2020 12:28 pm    Follow-up:  No follow-

## 2020-02-16 LAB
ANION GAP SERPL CALC-SCNC: 3 MMOL/L (ref 0–18)
ATRIAL RATE: 91 BPM
BUN BLD-MCNC: 31 MG/DL (ref 7–18)
BUN/CREAT SERPL: 10.9 (ref 10–20)
C DIFF TOX B STL QL: POSITIVE
CALCIUM BLD-MCNC: 7.8 MG/DL (ref 8.5–10.1)
CHLORIDE SERPL-SCNC: 116 MMOL/L (ref 98–112)
CO2 SERPL-SCNC: 20 MMOL/L (ref 21–32)
CREAT BLD-MCNC: 2.84 MG/DL (ref 0.7–1.3)
EST. AVERAGE GLUCOSE BLD GHB EST-MCNC: 120 MG/DL (ref 68–126)
GLUCOSE BLD-MCNC: 106 MG/DL (ref 70–99)
GLUCOSE BLD-MCNC: 75 MG/DL (ref 70–99)
GLUCOSE BLD-MCNC: 84 MG/DL (ref 70–99)
GLUCOSE BLD-MCNC: 92 MG/DL (ref 70–99)
HBA1C MFR BLD HPLC: 5.8 % (ref ?–5.7)
OSMOLALITY SERPL CALC.SUM OF ELEC: 294 MOSM/KG (ref 275–295)
P AXIS: 69 DEGREES
P-R INTERVAL: 142 MS
POTASSIUM SERPL-SCNC: 4.2 MMOL/L (ref 3.5–5.1)
Q-T INTERVAL: 332 MS
QRS DURATION: 84 MS
QTC CALCULATION (BEZET): 408 MS
R AXIS: -4 DEGREES
SODIUM SERPL-SCNC: 139 MMOL/L (ref 136–145)
T AXIS: 52 DEGREES
VENTRICULAR RATE: 91 BPM

## 2020-02-16 PROCEDURE — 99233 SBSQ HOSP IP/OBS HIGH 50: CPT | Performed by: HOSPITALIST

## 2020-02-16 RX ORDER — AMLODIPINE BESYLATE 5 MG/1
5 TABLET ORAL DAILY
Status: DISCONTINUED | OUTPATIENT
Start: 2020-02-16 | End: 2020-02-19

## 2020-02-16 RX ORDER — VANCOMYCIN HYDROCHLORIDE 250 MG/1
250 CAPSULE ORAL EVERY 6 HOURS SCHEDULED
Status: DISCONTINUED | OUTPATIENT
Start: 2020-02-16 | End: 2020-02-19

## 2020-02-16 RX ORDER — MEMANTINE HYDROCHLORIDE 10 MG/1
10 TABLET ORAL DAILY
Status: DISCONTINUED | OUTPATIENT
Start: 2020-02-16 | End: 2020-02-19

## 2020-02-16 RX ORDER — ENALAPRILAT 2.5 MG/2ML
1.25 INJECTION INTRAVENOUS EVERY 6 HOURS PRN
Status: DISCONTINUED | OUTPATIENT
Start: 2020-02-16 | End: 2020-02-19

## 2020-02-16 RX ORDER — SODIUM CHLORIDE 450 MG/100ML
INJECTION, SOLUTION INTRAVENOUS CONTINUOUS
Status: DISCONTINUED | OUTPATIENT
Start: 2020-02-16 | End: 2020-02-18

## 2020-02-16 RX ORDER — METOCLOPRAMIDE HYDROCHLORIDE 5 MG/ML
5 INJECTION INTRAMUSCULAR; INTRAVENOUS EVERY 8 HOURS PRN
Status: DISCONTINUED | OUTPATIENT
Start: 2020-02-16 | End: 2020-02-19

## 2020-02-16 RX ORDER — LISINOPRIL 20 MG/1
20 TABLET ORAL DAILY
Status: DISCONTINUED | OUTPATIENT
Start: 2020-02-16 | End: 2020-02-19

## 2020-02-16 RX ORDER — AMLODIPINE BESYLATE 5 MG/1
5 TABLET ORAL ONCE
Status: COMPLETED | OUTPATIENT
Start: 2020-02-16 | End: 2020-02-16

## 2020-02-16 RX ORDER — MULTIPLE VITAMINS W/ MINERALS TAB 9MG-400MCG
1 TAB ORAL DAILY
Status: DISCONTINUED | OUTPATIENT
Start: 2020-02-16 | End: 2020-02-19

## 2020-02-16 NOTE — PROGRESS NOTES
NURSING ADMISSION NOTE      Patient admitted via cart to room 429. Oriented to room. Safety precautions initiated. Bed in low position. Call light in reach. Admission navigator complete with wife and grandson at the bedside.      Pt AOX4 with no

## 2020-02-16 NOTE — ED NOTES
Housekeeping called regarding room cleaning status. Per  Mackenzie Dewey is in room, ETA for readiness 15 min.

## 2020-02-16 NOTE — PROGRESS NOTES
[facility] Pharmacy Note:  Renal Dose Adjustment for Metoclopramide (REGLAN)    Swathi Whitehead has been prescribed Metoclopramide (REGLAN) 10 mg every 8 hours as needed for nausea/vomiting.     Estimated Creatinine Clearance: 19.3 mL/min (A) (based on SCr of 3.1 mg

## 2020-02-16 NOTE — PROGRESS NOTES
[facility]    Progress Note    Ted Olivo Patient Status:  Inpatient    1940 MRN [id number]   [facility] 4NW-A Attending Kole Ibarra MD   Hosp Day # 1 PCP Kunal Griffith MD       SUBJECTIVE:   diarrhea slowing down (DEX4) oral liquid 15 g, 15 g, Oral, Q15 Min PRN    Or  Glucose-Vitamin C (DEX-4) chewable tab 4 tablet, 4 tablet, Oral, Q15 Min PRN    Or  dextrose 50 % injection 50 mL, 50 mL, Intravenous, Q15 Min PRN    Or  glucose (DEX4) oral liquid 30 g, 30 g, Oral, Q

## 2020-02-16 NOTE — PROGRESS NOTES
0700- Laboratory called with positive results for c.dif. Contact plus isolation maintained. Pt with two episodes of diarrhea overnight. 0800- Notified Dr. Elby Brittle of positive c.dif results. Orders for oral vancomycin placed.  Dr. Kaplan Javan she will add some o

## 2020-02-16 NOTE — ED NOTES
Floor called for room update, housekeeping has not been up to [facility] it yet.  Will call ED when room is ready

## 2020-02-17 LAB
ALBUMIN SERPL-MCNC: 2.3 G/DL (ref 3.4–5)
ALBUMIN/GLOB SERPL: 0.5 {RATIO} (ref 1–2)
ALP LIVER SERPL-CCNC: 34 U/L (ref 45–117)
ALT SERPL-CCNC: 23 U/L (ref 16–61)
ANION GAP SERPL CALC-SCNC: 6 MMOL/L (ref 0–18)
AST SERPL-CCNC: 36 U/L (ref 15–37)
BASOPHILS # BLD AUTO: 0.01 X10(3) UL (ref 0–0.2)
BASOPHILS NFR BLD AUTO: 0.3 %
BILIRUB SERPL-MCNC: 0.2 MG/DL (ref 0.1–2)
BUN BLD-MCNC: 26 MG/DL (ref 7–18)
BUN/CREAT SERPL: 10.6 (ref 10–20)
CALCIUM BLD-MCNC: 8.1 MG/DL (ref 8.5–10.1)
CHLORIDE SERPL-SCNC: 115 MMOL/L (ref 98–112)
CO2 SERPL-SCNC: 18 MMOL/L (ref 21–32)
CREAT BLD-MCNC: 2.46 MG/DL (ref 0.7–1.3)
DEPRECATED HBV CORE AB SER IA-ACNC: 50.9 NG/ML (ref 30–530)
DEPRECATED RDW RBC AUTO: 55.9 FL (ref 35.1–46.3)
EOSINOPHIL # BLD AUTO: 0.15 X10(3) UL (ref 0–0.7)
EOSINOPHIL NFR BLD AUTO: 4.2 %
ERYTHROCYTE [DISTWIDTH] IN BLOOD BY AUTOMATED COUNT: 16.2 % (ref 11–15)
GLOBULIN PLAS-MCNC: 4.3 G/DL (ref 2.8–4.4)
GLUCOSE BLD-MCNC: 103 MG/DL (ref 70–99)
GLUCOSE BLD-MCNC: 107 MG/DL (ref 70–99)
GLUCOSE BLD-MCNC: 122 MG/DL (ref 70–99)
GLUCOSE BLD-MCNC: 91 MG/DL (ref 70–99)
GLUCOSE BLD-MCNC: 98 MG/DL (ref 70–99)
HCT VFR BLD AUTO: 24.4 % (ref 39–53)
HGB BLD-MCNC: 7.4 G/DL (ref 13–17.5)
HGB BLD-MCNC: 7.9 G/DL (ref 13–17.5)
HGB RETIC QN AUTO: 29.9 PG (ref 28.2–36.6)
IMM GRANULOCYTES # BLD AUTO: 0.02 X10(3) UL (ref 0–1)
IMM GRANULOCYTES NFR BLD: 0.6 %
IMM RETICS NFR: 0.14 RATIO (ref 0.1–0.3)
IRON SATURATION: 7 % (ref 20–50)
IRON SERPL-MCNC: 20 UG/DL (ref 65–175)
LYMPHOCYTES # BLD AUTO: 0.78 X10(3) UL (ref 1–4)
LYMPHOCYTES NFR BLD AUTO: 22 %
M PROTEIN MFR SERPL ELPH: 6.6 G/DL (ref 6.4–8.2)
MCH RBC QN AUTO: 28.7 PG (ref 26–34)
MCHC RBC AUTO-ENTMCNC: 30.3 G/DL (ref 31–37)
MCV RBC AUTO: 94.6 FL (ref 80–100)
MONOCYTES # BLD AUTO: 0.47 X10(3) UL (ref 0.1–1)
MONOCYTES NFR BLD AUTO: 13.2 %
NEUTROPHILS # BLD AUTO: 2.12 X10 (3) UL (ref 1.5–7.7)
NEUTROPHILS # BLD AUTO: 2.12 X10(3) UL (ref 1.5–7.7)
NEUTROPHILS NFR BLD AUTO: 59.7 %
OSMOLALITY SERPL CALC.SUM OF ELEC: 292 MOSM/KG (ref 275–295)
PLATELET # BLD AUTO: 228 10(3)UL (ref 150–450)
POTASSIUM SERPL-SCNC: 4.3 MMOL/L (ref 3.5–5.1)
RBC # BLD AUTO: 2.58 X10(6)UL (ref 3.8–5.8)
RETICS # AUTO: 64 X10(3) UL (ref 22.5–147.5)
RETICS/RBC NFR AUTO: 2.5 % (ref 0.5–2.5)
SODIUM SERPL-SCNC: 139 MMOL/L (ref 136–145)
TOTAL IRON BINDING CAPACITY: 274 UG/DL (ref 240–450)
TRANSFERRIN SERPL-MCNC: 184 MG/DL (ref 200–360)
WBC # BLD AUTO: 3.6 X10(3) UL (ref 4–11)

## 2020-02-17 PROCEDURE — 99232 SBSQ HOSP IP/OBS MODERATE 35: CPT | Performed by: HOSPITALIST

## 2020-02-17 RX ORDER — AMLODIPINE BESYLATE 5 MG/1
5 TABLET ORAL ONCE
Status: COMPLETED | OUTPATIENT
Start: 2020-02-17 | End: 2020-02-17

## 2020-02-17 NOTE — PLAN OF CARE
Pt AOX4 with no complaints of pain. At the start of the shift the pt reported having 5 watery BM during the day. Overnight pt has had two so far. Pt denies abd cramping. Pt ate his dinner and tolerated well. Pt blood glucose 84 before bed.  Pt ate a snack a

## 2020-02-17 NOTE — PROGRESS NOTES
[facility]  Progress Note     Naomia Mort Patient Status:  Inpatient    1940 MRN [id number]   [facility] 4NW-A Attending Freeman Arellano MD   Hosp Day # 1 PCP Miguelito Perez MD     Chief Complaint: diarrhea abd pain    S Memantine HCl  10 mg Oral Daily   • multivitamin with minerals  1 tablet Oral Daily   • vitamin A  10,000 Units Oral Daily   • amLODIPine Besylate  5 mg Oral Daily   • aspirin  81 mg Oral Daily   • Heparin Sodium (Porcine)  5,000 Units Subcutaneous 2 times

## 2020-02-17 NOTE — PROGRESS NOTES
[facility]  Progress Note     Adam Murphy Patient Status:  Inpatient    1940 MRN [id number]   [facility] 4NW-A Attending Teodora Morin MD   Hosp Day # 2 PCP Breanna Cui MD     Chief Complaint: diarrhea    S: Patient Imaging data reviewed in Epic.     Medications:   • vancomycin HCl  250 mg Oral 4 times per day   • lisinopril  20 mg Oral Daily   • Memantine HCl  10 mg Oral Daily   • multivitamin with minerals  1 tablet Oral Daily   • vitamin A  10,000 Units Oral Daily

## 2020-02-17 NOTE — PLAN OF CARE
Problem: Diabetes/Glucose Control  Goal: Glucose maintained within prescribed range  Description  INTERVENTIONS:  - Monitor Blood Glucose as ordered  - Assess for signs and symptoms of hyperglycemia and hypoglycemia  - Administer ordered medications to m Monitor response to interventions for patient's volume status, including labs, urine output, blood pressure (other measures as available)  - Encourage oral intake as appropriate  - Instruct patient on fluid and nutrition restrictions as appropriate  Outcom

## 2020-02-17 NOTE — PAYOR COMM NOTE
--------------  Appropriate for inpatient status per guidelines for gastroenteritis with associated dehydration, N/V/D with HAYDE. C.Diff positive.        ADMISSION REVIEW     Payor: [address] [place] [address] #:  [id number]  Authorization Number: Review of Systems    Positive for stated complaint: sent from  for n/v/diarrhea w/ fever of 102.5  Other systems are as noted in HPI. Constitutional and vital signs reviewed. All other systems reviewed and negative except as noted above.     P Alkaline Phosphatase 37 (*)     Albumin 2.7 (*)     Globulin  4.7 (*)     A/G Ratio 0.6 (*)     All other components within normal limits   URINALYSIS WITH CULTURE REFLEX - Abnormal; Notable for the following components:    Clarity Urine Hazy (*)     Gluco Surgical History:   Procedure Laterality Date   • COLONOSCOPY     • EGD     • ENDOSCOPIC ULTRASOUND (EUS) N/A 10/25/2018    Performed by Deyanira Finn MD at [facility] ENDOSCOPY   • ESOPHAGOGASTRODUODENOSCOPY (EGD) N/A 3/29/2019    Performed by Orlando Plascencia results for input(s): PTP, INR in the last 168 hours. No results for input(s): TROP, CK in the last 168 hours. Imaging: Imaging data reviewed in Epic.       ASSESSMENT / PLAN:     1. Dehydration gastroenteritis  -Stool sample for cultures  -IV hydrati 02/16/20 1700 184/69Abnormal          02/16/20 0530 98.3 °F (36.8 °C) 91 18 154/80 98 % — None (Room air)       02/15/20 1300 99.3 °F (37.4 °C) 90 16 138/73 99 % 175 lb None (Room air)

## 2020-02-17 NOTE — PLAN OF CARE
Pt is aox4 on room air and denies any sob at this time. Pt has denied pain , and pt has been afebrile during this shift. Pt blood pressure was elevated 182/69 home blood pressure medication was restarted and will recheck blood pressure in 1 hour.    Per MD

## 2020-02-17 NOTE — PROGRESS NOTES
[facility]    Progress Note    Rebekah Minor Patient Status:  Inpatient    1940 MRN [id number]   [facility] 4NW-A Attending Noni Pollack MD   Hosp Day # 2 PCP Seng Bain MD       SUBJECTIVE:   still with some diarrhe with minerals (ADULT) tab 1 tablet, 1 tablet, Oral, Daily  vitamin A (AQUASOL A) cap 10,000 Units, 10,000 Units, Oral, Daily  amLODIPine Besylate (NORVASC) tab 5 mg, 5 mg, Oral, Daily  0.45% NaCl infusion, , Intravenous, Continuous  enalaprilat (VASOTEC) i

## 2020-02-18 ENCOUNTER — ANESTHESIA EVENT (OUTPATIENT)
Dept: ENDOSCOPY | Facility: HOSPITAL | Age: 80
DRG: 372 | End: 2020-02-18
Payer: MEDICARE

## 2020-02-18 ENCOUNTER — ANESTHESIA (OUTPATIENT)
Dept: ENDOSCOPY | Facility: HOSPITAL | Age: 80
DRG: 372 | End: 2020-02-18
Payer: MEDICARE

## 2020-02-18 LAB
ALBUMIN SERPL-MCNC: 2.4 G/DL (ref 3.4–5)
ALBUMIN/GLOB SERPL: 0.5 {RATIO} (ref 1–2)
ALP LIVER SERPL-CCNC: 39 U/L (ref 45–117)
ALT SERPL-CCNC: 30 U/L (ref 16–61)
ANION GAP SERPL CALC-SCNC: 8 MMOL/L (ref 0–18)
AST SERPL-CCNC: 34 U/L (ref 15–37)
BASOPHILS # BLD AUTO: 0.01 X10(3) UL (ref 0–0.2)
BASOPHILS NFR BLD AUTO: 0.3 %
BILIRUB SERPL-MCNC: 0.2 MG/DL (ref 0.1–2)
BUN BLD-MCNC: 23 MG/DL (ref 7–18)
BUN/CREAT SERPL: 10 (ref 10–20)
CALCIUM BLD-MCNC: 8.5 MG/DL (ref 8.5–10.1)
CHLORIDE SERPL-SCNC: 113 MMOL/L (ref 98–112)
CO2 SERPL-SCNC: 18 MMOL/L (ref 21–32)
CREAT BLD-MCNC: 2.29 MG/DL (ref 0.7–1.3)
DEPRECATED RDW RBC AUTO: 53.2 FL (ref 35.1–46.3)
EOSINOPHIL # BLD AUTO: 0.12 X10(3) UL (ref 0–0.7)
EOSINOPHIL NFR BLD AUTO: 3.7 %
ERYTHROCYTE [DISTWIDTH] IN BLOOD BY AUTOMATED COUNT: 15.8 % (ref 11–15)
GLOBULIN PLAS-MCNC: 4.5 G/DL (ref 2.8–4.4)
GLUCOSE BLD-MCNC: 116 MG/DL (ref 70–99)
GLUCOSE BLD-MCNC: 122 MG/DL (ref 70–99)
GLUCOSE BLD-MCNC: 156 MG/DL (ref 70–99)
GLUCOSE BLD-MCNC: 172 MG/DL (ref 70–99)
GLUCOSE BLD-MCNC: 79 MG/DL (ref 70–99)
GLUCOSE BLD-MCNC: 95 MG/DL (ref 70–99)
HCT VFR BLD AUTO: 25.3 % (ref 39–53)
HGB BLD-MCNC: 7.8 G/DL (ref 13–17.5)
IMM GRANULOCYTES # BLD AUTO: 0.01 X10(3) UL (ref 0–1)
IMM GRANULOCYTES NFR BLD: 0.3 %
LYMPHOCYTES # BLD AUTO: 0.86 X10(3) UL (ref 1–4)
LYMPHOCYTES NFR BLD AUTO: 26.7 %
M PROTEIN MFR SERPL ELPH: 6.9 G/DL (ref 6.4–8.2)
MCH RBC QN AUTO: 28.9 PG (ref 26–34)
MCHC RBC AUTO-ENTMCNC: 30.8 G/DL (ref 31–37)
MCV RBC AUTO: 93.7 FL (ref 80–100)
MONOCYTES # BLD AUTO: 0.42 X10(3) UL (ref 0.1–1)
MONOCYTES NFR BLD AUTO: 13 %
NEUTROPHILS # BLD AUTO: 1.8 X10 (3) UL (ref 1.5–7.7)
NEUTROPHILS # BLD AUTO: 1.8 X10(3) UL (ref 1.5–7.7)
NEUTROPHILS NFR BLD AUTO: 56 %
OSMOLALITY SERPL CALC.SUM OF ELEC: 293 MOSM/KG (ref 275–295)
PLATELET # BLD AUTO: 229 10(3)UL (ref 150–450)
POTASSIUM SERPL-SCNC: 3.8 MMOL/L (ref 3.5–5.1)
RBC # BLD AUTO: 2.7 X10(6)UL (ref 3.8–5.8)
SODIUM SERPL-SCNC: 139 MMOL/L (ref 136–145)
WBC # BLD AUTO: 3.2 X10(3) UL (ref 4–11)

## 2020-02-18 PROCEDURE — 0D578ZZ DESTRUCTION OF STOMACH, PYLORUS, VIA NATURAL OR ARTIFICIAL OPENING ENDOSCOPIC: ICD-10-PCS | Performed by: INTERNAL MEDICINE

## 2020-02-18 PROCEDURE — 0D598ZZ DESTRUCTION OF DUODENUM, VIA NATURAL OR ARTIFICIAL OPENING ENDOSCOPIC: ICD-10-PCS | Performed by: INTERNAL MEDICINE

## 2020-02-18 RX ORDER — ONDANSETRON 2 MG/ML
4 INJECTION INTRAMUSCULAR; INTRAVENOUS AS NEEDED
Status: DISCONTINUED | OUTPATIENT
Start: 2020-02-18 | End: 2020-02-18 | Stop reason: HOSPADM

## 2020-02-18 RX ORDER — LIDOCAINE HYDROCHLORIDE 10 MG/ML
INJECTION, SOLUTION EPIDURAL; INFILTRATION; INTRACAUDAL; PERINEURAL AS NEEDED
Status: DISCONTINUED | OUTPATIENT
Start: 2020-02-18 | End: 2020-02-18 | Stop reason: SURG

## 2020-02-18 RX ORDER — DEXTROSE AND SODIUM CHLORIDE 5; .45 G/100ML; G/100ML
INJECTION, SOLUTION INTRAVENOUS CONTINUOUS
Status: DISCONTINUED | OUTPATIENT
Start: 2020-02-18 | End: 2020-02-18

## 2020-02-18 RX ORDER — HYDROMORPHONE HYDROCHLORIDE 1 MG/ML
0.4 INJECTION, SOLUTION INTRAMUSCULAR; INTRAVENOUS; SUBCUTANEOUS EVERY 5 MIN PRN
Status: DISCONTINUED | OUTPATIENT
Start: 2020-02-18 | End: 2020-02-18 | Stop reason: HOSPADM

## 2020-02-18 RX ORDER — DIPHENHYDRAMINE HYDROCHLORIDE 50 MG/ML
12.5 INJECTION INTRAMUSCULAR; INTRAVENOUS AS NEEDED
Status: DISCONTINUED | OUTPATIENT
Start: 2020-02-18 | End: 2020-02-18 | Stop reason: HOSPADM

## 2020-02-18 RX ORDER — SODIUM CHLORIDE, SODIUM LACTATE, POTASSIUM CHLORIDE, CALCIUM CHLORIDE 600; 310; 30; 20 MG/100ML; MG/100ML; MG/100ML; MG/100ML
INJECTION, SOLUTION INTRAVENOUS CONTINUOUS
Status: DISCONTINUED | OUTPATIENT
Start: 2020-02-18 | End: 2020-02-18

## 2020-02-18 RX ORDER — MELATONIN
325 2 TIMES DAILY WITH MEALS
Status: DISCONTINUED | OUTPATIENT
Start: 2020-02-18 | End: 2020-02-19

## 2020-02-18 RX ORDER — SODIUM CHLORIDE 450 MG/100ML
INJECTION, SOLUTION INTRAVENOUS CONTINUOUS
Status: DISCONTINUED | OUTPATIENT
Start: 2020-02-18 | End: 2020-02-19

## 2020-02-18 RX ORDER — DEXTROSE MONOHYDRATE 25 G/50ML
50 INJECTION, SOLUTION INTRAVENOUS
Status: DISCONTINUED | OUTPATIENT
Start: 2020-02-18 | End: 2020-02-18 | Stop reason: HOSPADM

## 2020-02-18 RX ORDER — METOCLOPRAMIDE HYDROCHLORIDE 5 MG/ML
10 INJECTION INTRAMUSCULAR; INTRAVENOUS AS NEEDED
Status: DISCONTINUED | OUTPATIENT
Start: 2020-02-18 | End: 2020-02-18 | Stop reason: HOSPADM

## 2020-02-18 RX ORDER — NALOXONE HYDROCHLORIDE 0.4 MG/ML
80 INJECTION, SOLUTION INTRAMUSCULAR; INTRAVENOUS; SUBCUTANEOUS AS NEEDED
Status: DISCONTINUED | OUTPATIENT
Start: 2020-02-18 | End: 2020-02-18 | Stop reason: HOSPADM

## 2020-02-18 RX ADMIN — LIDOCAINE HYDROCHLORIDE 50 MG: 10 INJECTION, SOLUTION EPIDURAL; INFILTRATION; INTRACAUDAL; PERINEURAL at 15:31:00

## 2020-02-18 NOTE — OPERATIVE REPORT
Operative Report-Esophagogastroduodenoscopy      PREOPERATIVE DIAGNOSIS/INDICATION: Anemia    POSTOPERTATIVE DIAGNOSIS: AVMs, gastric nodule     PROCEDURE PERFORMED: EGD    INFORMED CONSENT: Once a brief history and physical examin

## 2020-02-18 NOTE — PROGRESS NOTES
[facility]    Progress Note    Rashi Betts Patient Status:  Inpatient    1940 MRN [id number]   [facility] 4NW-A Attending Colletta Carrie, MD   Hosp Day # 3 PCP Kunal Lombardi MD       SUBJECTIVE:  Diarrhea nearly resolved injection 5 mg, 5 mg, Intravenous, Q8H PRN  lisinopril tab 20 mg, 20 mg, Oral, Daily  Memantine HCl (NAMENDA) tab 10 mg, 10 mg, Oral, Daily  multivitamin with minerals (ADULT) tab 1 tablet, 1 tablet, Oral, Daily  vitamin A (AQUASOL A) cap 10,000 Units, 10,

## 2020-02-18 NOTE — PLAN OF CARE
Assumed pt care at [address] [place] [address]. Day RN had just received orders for H&H, occult blood and one time dose of amlodipine, medication given per [facility] and hemoglobin results paged to MD. Stool collected and sent to lab. VSS, afebrile. Pt denies pain, SOB or nausea.  Isol Monitor labs and assess for signs and symptoms of volume excess or deficit  - Monitor intake, output and patient weight  - Monitor urine specific gravity, serum osmolarity and serum sodium as indicated or ordered  - Monitor response to interventions for pa

## 2020-02-18 NOTE — PLAN OF CARE
Problem: Diabetes/Glucose Control  Goal: Glucose maintained within prescribed range  Description  INTERVENTIONS:  - Monitor Blood Glucose as ordered  - Assess for signs and symptoms of hyperglycemia and hypoglycemia  - Administer ordered medications to m Progressing  Goal: Hemodynamic stability and optimal renal function maintained  Description  INTERVENTIONS:  - Monitor labs and assess for signs and symptoms of volume excess or deficit  - Monitor intake, output and patient weight  - Monitor urine specific

## 2020-02-18 NOTE — ANESTHESIA PREPROCEDURE EVALUATION
PRE-OP EVALUATION    Patient Name: Leandro Olivo    Pre-op Diagnosis: Anemia,HX OF AVM    Procedure(s):  ESOPHAGOGASTRODUODENOSCOPY AND PUSH ENTEROSCOPY    Surgeon(s) and Role:     Manny Abdi MD - Primary    Pre-op vitals reviewed.   Temp: 97.2 ° 15 g, Oral, Q15 Min PRN    Or  Glucose-Vitamin C (DEX-4) chewable tab 4 tablet, 4 tablet, Oral, Q15 Min PRN    Or  dextrose 50 % injection 50 mL, 50 mL, Intravenous, Q15 Min PRN    Or  glucose (DEX4) oral liquid 30 g, 30 g, Oral, Q15 Min PRN    Or  Glucose Rfl:   Cholecalciferol (VITAMIN D) 1000 units Oral Tab, Take 1,000 Int'l Units by mouth daily. , Disp: , Rfl:   vitamin E 400 UNITS Oral Cap, Take 400 Units by mouth daily. , Disp: , Rfl:         Allergies: Bactrim [Sulfamethoxazole W/Trimethoprim]      Anes  (H) 02/18/2020    CA 8.5 02/18/2020            Airway      Mallampati: II  Mouth opening: 3 FB  TM distance: 4 - 6 cm  Neck ROM: full Cardiovascular      Rhythm: regular  Rate: normal     Dental  Comment: No loose           Pulmonary      Breath so

## 2020-02-18 NOTE — CONSULTS
[facility]                       Gastroenterology [address] [facility] ER Gastroenterology    [facility] Patient Status:  Inpatient    1940 MRN [id number]   [facility] 4NW-A Attending MD Nikky Pierce [facility] ENDOSCOPY   • ESOPHAGOGASTRODUODENOSCOPY (EGD) N/A 7/3/2018    Performed by Severo Posey, MD at [facility] ENDOSCOPY   • OTHER      Jaw Hinge Joint Repair - Adolescence   • OTHER SURGICAL HISTORY      incision and drainage and skin graft-bilateral arms Units, Subcutaneous, TID CC and HS  [] 0.9% NaCl infusion, , Intravenous, Continuous  [] HYDROmorphone HCl (DILAUDID) 1 MG/ML injection 0.5 mg, 0.5 mg, Intravenous, Q30 Min PRN  [COMPLETED] acetaminophen (TYLENOL EXTRA STRENGTH) tab 1,000 mg, Temp 97.7 °F (36.5 °C) (Oral)   Resp 18   Ht 5' 10\" (1.778 m)   Wt 175 lb (79.4 kg)   SpO2 98%   BMI 25.11 kg/m²     Gen: AAO x 3, able to speak in complete sentences    HENT: EOMI, PERRL, oropharynx is clear with moist mucosal membranes    Eyes: Sclerae APC  ________________________________________________________________________________________    Colonoscopy: 1/2018: Dr Velazquez Right  Indication: Iron deficiency anemia   Findings: 1 five mm cecal polyp (tubular adenoma), left sided diverticulosis, and internal h

## 2020-02-19 VITALS
HEIGHT: 70 IN | BODY MASS INDEX: 25.05 KG/M2 | TEMPERATURE: 98 F | HEART RATE: 59 BPM | OXYGEN SATURATION: 98 % | RESPIRATION RATE: 18 BRPM | SYSTOLIC BLOOD PRESSURE: 153 MMHG | WEIGHT: 175 LBS | DIASTOLIC BLOOD PRESSURE: 56 MMHG

## 2020-02-19 LAB
ALBUMIN SERPL-MCNC: 2.3 G/DL (ref 3.4–5)
ALBUMIN/GLOB SERPL: 0.5 {RATIO} (ref 1–2)
ALP LIVER SERPL-CCNC: 35 U/L (ref 45–117)
ALT SERPL-CCNC: 41 U/L (ref 16–61)
ANION GAP SERPL CALC-SCNC: 5 MMOL/L (ref 0–18)
AST SERPL-CCNC: 42 U/L (ref 15–37)
BASOPHILS # BLD AUTO: 0.02 X10(3) UL (ref 0–0.2)
BASOPHILS NFR BLD AUTO: 0.5 %
BILIRUB SERPL-MCNC: 0.2 MG/DL (ref 0.1–2)
BUN BLD-MCNC: 23 MG/DL (ref 7–18)
BUN/CREAT SERPL: 9.6 (ref 10–20)
CALCIUM BLD-MCNC: 8.2 MG/DL (ref 8.5–10.1)
CHLORIDE SERPL-SCNC: 113 MMOL/L (ref 98–112)
CO2 SERPL-SCNC: 21 MMOL/L (ref 21–32)
CREAT BLD-MCNC: 2.4 MG/DL (ref 0.7–1.3)
DEPRECATED RDW RBC AUTO: 51.8 FL (ref 35.1–46.3)
EOSINOPHIL # BLD AUTO: 0.15 X10(3) UL (ref 0–0.7)
EOSINOPHIL NFR BLD AUTO: 3.9 %
ERYTHROCYTE [DISTWIDTH] IN BLOOD BY AUTOMATED COUNT: 15.5 % (ref 11–15)
GLOBULIN PLAS-MCNC: 4.4 G/DL (ref 2.8–4.4)
GLUCOSE BLD-MCNC: 143 MG/DL (ref 70–99)
GLUCOSE BLD-MCNC: 147 MG/DL (ref 70–99)
GLUCOSE BLD-MCNC: 149 MG/DL (ref 70–99)
HCT VFR BLD AUTO: 23.9 % (ref 39–53)
HGB BLD-MCNC: 7.4 G/DL (ref 13–17.5)
IMM GRANULOCYTES # BLD AUTO: 0.01 X10(3) UL (ref 0–1)
IMM GRANULOCYTES NFR BLD: 0.3 %
LYMPHOCYTES # BLD AUTO: 1.07 X10(3) UL (ref 1–4)
LYMPHOCYTES NFR BLD AUTO: 27.7 %
M PROTEIN MFR SERPL ELPH: 6.7 G/DL (ref 6.4–8.2)
MCH RBC QN AUTO: 28.5 PG (ref 26–34)
MCHC RBC AUTO-ENTMCNC: 31 G/DL (ref 31–37)
MCV RBC AUTO: 91.9 FL (ref 80–100)
MONOCYTES # BLD AUTO: 0.52 X10(3) UL (ref 0.1–1)
MONOCYTES NFR BLD AUTO: 13.5 %
NEUTROPHILS # BLD AUTO: 2.09 X10 (3) UL (ref 1.5–7.7)
NEUTROPHILS # BLD AUTO: 2.09 X10(3) UL (ref 1.5–7.7)
NEUTROPHILS NFR BLD AUTO: 54.1 %
OSMOLALITY SERPL CALC.SUM OF ELEC: 294 MOSM/KG (ref 275–295)
PLATELET # BLD AUTO: 239 10(3)UL (ref 150–450)
POTASSIUM SERPL-SCNC: 3.9 MMOL/L (ref 3.5–5.1)
RBC # BLD AUTO: 2.6 X10(6)UL (ref 3.8–5.8)
SODIUM SERPL-SCNC: 139 MMOL/L (ref 136–145)
WBC # BLD AUTO: 3.9 X10(3) UL (ref 4–11)

## 2020-02-19 RX ORDER — PANTOPRAZOLE SODIUM 40 MG/1
40 TABLET, DELAYED RELEASE ORAL
Qty: 60 TABLET | Refills: 1 | Status: SHIPPED | OUTPATIENT
Start: 2020-02-19

## 2020-02-19 RX ORDER — PANTOPRAZOLE SODIUM 40 MG/1
40 TABLET, DELAYED RELEASE ORAL
Qty: 60 TABLET | Refills: 1 | Status: SHIPPED | OUTPATIENT
Start: 2020-02-19 | End: 2020-02-19

## 2020-02-19 RX ORDER — AMLODIPINE BESYLATE 5 MG/1
5 TABLET ORAL 2 TIMES DAILY
Qty: 60 TABLET | Refills: 0 | Status: SHIPPED | OUTPATIENT
Start: 2020-02-19 | End: 2021-08-23

## 2020-02-19 RX ORDER — AMLODIPINE BESYLATE 5 MG/1
5 TABLET ORAL DAILY
Qty: 60 TABLET | Refills: 0 | Status: SHIPPED | OUTPATIENT
Start: 2020-02-19 | End: 2020-02-19

## 2020-02-19 RX ORDER — VANCOMYCIN HYDROCHLORIDE 250 MG/1
250 CAPSULE ORAL EVERY 6 HOURS SCHEDULED
Qty: 40 CAPSULE | Refills: 0 | Status: SHIPPED | OUTPATIENT
Start: 2020-02-19 | End: 2020-07-29

## 2020-02-19 RX ORDER — MULTIPLE VITAMINS W/ MINERALS TAB 9MG-400MCG
1 TAB ORAL DAILY
Qty: 30 TABLET | Refills: 0 | Status: SHIPPED | OUTPATIENT
Start: 2020-02-20

## 2020-02-19 RX ORDER — MELATONIN
325 2 TIMES DAILY WITH MEALS
Qty: 90 TABLET | Refills: 0 | Status: SHIPPED | OUTPATIENT
Start: 2020-02-19 | End: 2021-08-23

## 2020-02-19 NOTE — PLAN OF CARE
Pt alert/oriented x 4. Having multiple bowel movements today, stool formed and soft. Cdiff isolation, on oral vanco scheduled. Hgb 7.4, stable. Denies pain/discomfort today. Vitals stable. IVF infusing. Accuchecks stable.   [id number] Megan Lay for discharge from GI s and assess for signs and symptoms of volume excess or deficit  - Monitor intake, output and patient weight  - Monitor urine specific gravity, serum osmolarity and serum sodium as indicated or ordered  - Monitor response to interventions for patient's volum

## 2020-02-19 NOTE — PLAN OF CARE
Patient alert and orientedx4. Up ad jeovany. IVF infusing. Blood sugar stable. Tolerating regular diet well. Hopes to go home tomorrow. No complaints of pain. No s/s of bleeding. Will monitor.

## 2020-02-19 NOTE — PROGRESS NOTES
NURSING DISCHARGE NOTE    Discharged Home via Ambulatory. Accompanied by RN  Belongings Taken by patient/family. Dr. Kathia Andrew here to see, ok for discharge home today. Discussed discharge instructions and medications with pt.   Verbalizes arniein

## 2020-02-19 NOTE — PROGRESS NOTES
D/w Dr Mary Anne Walton on 2/17-she asked the RN to page me to her cellphone-I called back and she decided to take over patient care.

## 2020-02-19 NOTE — DISCHARGE SUMMARY
[facility]    Discharge Summary    Vahid Rosas Patient Status:  Inpatient    1940 MRN [id number]   [facility] 4NW-A Attending Ad Li MD   Hosp Day # 4 PCP Pricilla Alberto MD     Date of Admission: 2/15/2020 Dispos Abdomen:     Soft, non-tender, bowel sounds active all four quadrants,     no masses, no organomegaly   Genitalia:     Rectal:     Extremities:   Extremities normal, atraumatic, no cyanosis or edema   Pulses:   2+ and symmetric all extremities   Skin: mouth daily. Refills:  0     Silodosin 8 MG Caps  Commonly known as:  Rapaflo      Take 1 capsule (8 mg total) by mouth 2 (two) times daily. Quantity:  180 capsule  Refills:  2     Tab-A-Tyron Maximum Tabs  Ask about: Which instructions should I use?

## 2020-02-19 NOTE — PROGRESS NOTES
[facility] Gastroenterology Progress Note    S: Pt feels well with no complaints. Denies abd pain, gi bleeding.      O: /56 (BP Location: Left arm)   Pulse 59   Temp 97.9 °F (36.6 °C) (Oral)   Resp 18   Ht 70\"   Wt 175 lb (79.4 kg)   SpO2 98%

## 2020-02-20 NOTE — PAYOR COMM NOTE
--------------  DISCHARGE REVIEW    Payor: [facility] [place] #:  [id number]  Authorization Number: [id number]    Admit date: 2/15/20  Admit time:  2046  Discharge Date: 2/19/2020  3:37 PM     Admitting Physician: Colletta Carrie, MD

## 2020-06-03 ENCOUNTER — PRIOR ORIGINAL RECORDS (OUTPATIENT)
Dept: OTHER | Age: 80
End: 2020-06-03

## 2020-08-05 ENCOUNTER — PRIOR ORIGINAL RECORDS (OUTPATIENT)
Dept: OTHER | Age: 80
End: 2020-08-05

## 2020-08-05 PROCEDURE — 99214 OFFICE O/P EST MOD 30 MIN: CPT | Performed by: INTERNAL MEDICINE

## 2020-10-09 LAB
% SATURATION: 31 % (CALC) (ref 15–60)
% SATURATION: 32 % (CALC) (ref 15–60)
% SATURATION: 33 % (CALC) (ref 15–60)
ALBUMIN/GLOB SERPL: 0.9 (CALC) (ref 1–2.5)
ALBUMIN/GLOB SERPL: 1 (CALC) (ref 1–2.5)
ALBUMIN/GLOB SERPL: 1 (CALC) (ref 1–2.5)
ALBUMIN: 3.6 G/DL (ref 3.6–5.1)
ALBUMIN: 3.7 G/DL (ref 3.6–5.1)
ALBUMIN: 3.7 G/DL (ref 3.6–5.1)
ALBUMIN: 3.7 G/DL (ref 3.8–4.8)
ALKALINE PHOSPHATASE: 30 UNIT/L (ref 40–115)
ALKALINE PHOSPHATASE: 35 UNIT/L (ref 40–115)
ALKALINE PHOSPHATASE: 37 UNIT/L (ref 40–115)
ALPHA-1-GLOBULINS: 0.3 G/DL (ref 0.2–0.3)
ALPHA-2-GLOBULINS: 0.7 G/DL (ref 0.5–0.9)
ALT: 15 UNIT/L (ref 9–46)
ALT: 18 UNIT/L (ref 9–46)
ALT: 23 UNIT/L (ref 9–46)
AST: 19 UNIT/L (ref 10–35)
AST: 21 UNIT/L (ref 10–35)
AST: 28 UNIT/L (ref 10–35)
BASO%: 0.6 %
BASO%: 0.6 %
BASO%: 0.9 %
BASO: 0 10^3/UL
BASO: 0 10^3/UL
BASO: 0.1 10^3/UL
BETA 1 GLOBULIN: 0.6 G/DL (ref 0.4–0.6)
BETA 2 GLOBULIN: 0.7 G/DL (ref 0.2–0.5)
BILIRUBIN, TOTAL: 0.4 MG/DL (ref 0.2–1.2)
BUN/CREATININE RATIO: 15 (CALC) (ref 6–22)
BUN/CREATININE RATIO: 15 (CALC) (ref 6–22)
BUN/CREATININE RATIO: 18 (CALC) (ref 6–22)
CALCIUM: 9.1 MG/DL (ref 8.6–10.3)
CALCIUM: 9.5 MG/DL (ref 8.6–10.3)
CALCIUM: 9.6 MG/DL (ref 8.6–10.3)
CARBON DIOXIDE: 19 MMOL/L (ref 20–32)
CARBON DIOXIDE: 23 MMOL/L (ref 20–32)
CARBON DIOXIDE: 26 MMOL/L (ref 20–32)
CHLORIDE: 104 MMOL/L (ref 98–110)
CHLORIDE: 108 MMOL/L (ref 98–110)
CHLORIDE: 108 MMOL/L (ref 98–110)
CRCL (C&G) (MOSAIQ HL): 34.08 ML/MIN
CRCL (C&G) (MOSAIQ HL): 36.3 ML/MIN
CRCL (C&G) (MOSAIQ HL): 42.97 ML/MIN
CREATININE CLEARANCE (MOSAIQ HL): 25.6 ML/MIN
CREATININE CLEARANCE (MOSAIQ HL): 29.2 ML/MIN
CREATININE CLEARANCE (MOSAIQ HL): 36 ML/MIN
CREATININE: 1.39 MG/DL (ref 0.7–1.11)
CREATININE: 1.71 MG/DL (ref 0.7–1.11)
CREATININE: 1.95 MG/DL (ref 0.7–1.11)
EGFR AFRICAN AMERICAN: 37 ML/MIN/1.73M2
EGFR AFRICAN AMERICAN: 43 ML/MIN/1.73M2
EGFR AFRICAN AMERICAN: 55 ML/MIN/1.73M2
EGFR NON-AFR. AMERICAN: 32 ML/MIN/1.73M2
EGFR NON-AFR. AMERICAN: 37 ML/MIN/1.73M2
EGFR NON-AFR. AMERICAN: 48 ML/MIN/1.73M2
EOS%: 3.4 %
EOS%: 3.8 %
EOS%: 5 %
EOS: 0.2 10^3/UL
EOS: 0.2 10^3/UL
EOS: 0.3 10^3/UL
ERYTHROPOIETIN: 12.7 MIU/ML (ref 2.6–18.5)
FERRITIN: 36 NG/ML (ref 20–380)
FERRITIN: 41 NG/ML (ref 20–380)
FERRITIN: 52 NG/ML (ref 20–380)
FOLATE, SERUM: 12.7 NG/ML
FOLATE, SERUM: 20.3 NG/ML
FOLATE, SERUM: 8.5 NG/ML
GAMMA GLOBULINS: 1.5 G/DL (ref 0.8–1.7)
GLOBULIN: 3.7 G/DL (CALC) (ref 1.9–3.7)
GLOBULIN: 3.8 G/DL (CALC) (ref 1.9–3.7)
GLOBULIN: 4 G/DL (CALC) (ref 1.9–3.7)
GLUCOSE: 153 MG/DL (ref 65–99)
GLUCOSE: 194 MG/DL (ref 65–99)
GLUCOSE: 251 MG/DL (ref 65–99)
HCT: 32.1 % (ref 38–54)
HCT: 32.8 % (ref 38–54)
HCT: 34.7 % (ref 38–54)
HGB: 10.3 G/DL (ref 12–18)
HGB: 10.4 G/DL (ref 12–18)
HGB: 11 G/DL (ref 12–18)
INTERPRETATION: ABNORMAL
INTERPRETATION: NORMAL
IRON BINDING CAPACITY: 318 MCG/DL (CALC) (ref 250–425)
IRON BINDING CAPACITY: 332 MCG/DL (CALC) (ref 250–425)
IRON BINDING CAPACITY: 350 MCG/DL (CALC) (ref 250–425)
IRON, TOTAL: 103 MCG/DL (ref 50–180)
IRON, TOTAL: 106 MCG/DL (ref 50–180)
IRON, TOTAL: 113 MCG/DL (ref 50–180)
LYMPH%: 14.5 % (ref 12–44)
LYMPH%: 14.8 % (ref 12–44)
LYMPH%: 18.5 % (ref 12–44)
LYMPH: 0.7 10^3/UL (ref 0.8–2.8)
LYMPH: 0.7 10^3/UL (ref 0.8–2.8)
LYMPH: 1 10^3/UL (ref 0.8–2.8)
MCH: 27.8 PG (ref 26–33)
MCH: 28.4 PG (ref 26–33)
MCH: 28.6 PG (ref 26–33)
MCHC: 31.7 G/DL (ref 31–36)
MCHC: 31.7 G/DL (ref 31–36)
MCHC: 32.1 G/DL (ref 31–36)
MCV: 87.7 FML (ref 82–100)
MCV: 89.2 FML (ref 82–100)
MCV: 89.7 FML (ref 82–100)
MONO%: 6 % (ref 2–12)
MONO%: 8.8 % (ref 2–12)
MONO%: 9.9 % (ref 2–12)
MONO: 0.3 10^3/UL (ref 0.2–1)
MONO: 0.5 10^3/UL (ref 0.2–1)
MONO: 0.5 10^3/UL (ref 0.2–1)
MPV: 9.1 FML (ref 8.6–11.7)
MPV: 9.5 FML (ref 8.6–11.7)
MPV: 9.8 FML (ref 8.6–11.7)
NEUT%: 66.8 % (ref 47–76)
NEUT%: 71.6 % (ref 47–76)
NEUT%: 74.8 % (ref 47–76)
NEUT: 3.4 10^3/UL (ref 1.5–7.1)
NEUT: 3.6 10^3/UL (ref 1.5–7.1)
NEUT: 3.8 10^3/UL (ref 1.5–7.1)
PLT: 279 10^3/UL (ref 150–375)
PLT: 314 10^3/UL (ref 150–375)
PLT: 316 10^3/UL (ref 150–375)
POTASSIUM: 4 MMOL/L (ref 3.5–5.3)
POTASSIUM: 4.9 MMOL/L (ref 3.5–5.3)
POTASSIUM: 5.3 MMOL/L (ref 3.5–5.3)
PROTEIN, TOTAL: 7.3 G/DL (ref 6.1–8.1)
PROTEIN, TOTAL: 7.5 G/DL (ref 6.1–8.1)
PROTEIN, TOTAL: 7.7 G/DL (ref 6.1–8.1)
RBC: 3.6 10^6/UL (ref 4.2–6.2)
RBC: 3.74 10^6/UL (ref 4.2–6.2)
RBC: 3.87 10^6/UL (ref 4.2–6.2)
RDW-CV: 15.9 %
RDW-CV: 16.8 %
RDW-CV: 18.5 %
RDW-SD: 51.2 FML (ref 36–50)
RDW-SD: 52.9 FML (ref 36–50)
RDW-SD: 56.6 FML (ref 36–50)
RETICULOCYTE COUNT,$AUTOMATED: 1.9 %
RETICULOCYTE, ABSOLUTE: NORMAL CELLS/UL (ref 25000–90000)
SODIUM: 138 MMOL/L (ref 135–146)
SODIUM: 139 MMOL/L (ref 135–146)
SODIUM: 141 MMOL/L (ref 135–146)
UREA NITROGEN (BUN): 25 MG/DL (ref 7–25)
UREA NITROGEN (BUN): 26 MG/DL (ref 7–25)
UREA NITROGEN (BUN): 29 MG/DL (ref 7–25)
VITAMIN B12: 414 PG/ML (ref 200–1100)
VITAMIN B12: 447 PG/ML (ref 200–1100)
VITAMIN B12: 496 PG/ML (ref 200–1100)
WBC: 4.8 10^3/UL (ref 4.3–11)
WBC: 5 10^3/UL (ref 4.3–11)
WBC: 5.4 10^3/UL (ref 4.3–11)

## 2020-10-11 VITALS — DIASTOLIC BLOOD PRESSURE: 66 MMHG | SYSTOLIC BLOOD PRESSURE: 134 MMHG | WEIGHT: 164.2 LBS

## 2020-10-11 VITALS
DIASTOLIC BLOOD PRESSURE: 76 MMHG | BODY MASS INDEX: 22.62 KG/M2 | HEIGHT: 70 IN | WEIGHT: 158 LBS | SYSTOLIC BLOOD PRESSURE: 138 MMHG

## 2020-10-11 VITALS — SYSTOLIC BLOOD PRESSURE: 151 MMHG | WEIGHT: 175.79 LBS | DIASTOLIC BLOOD PRESSURE: 78 MMHG

## 2020-10-13 VITALS — SYSTOLIC BLOOD PRESSURE: 163 MMHG | WEIGHT: 177 LBS | DIASTOLIC BLOOD PRESSURE: 64 MMHG | BODY MASS INDEX: 25.4 KG/M2

## 2020-10-13 LAB
BASO%: 0.5 %
BASO: 0 10^3/UL
EOS%: 2.7 %
EOS: 0.2 10^3/UL
HCT: 27.4 % (ref 38–54)
HGB: 8.5 G/DL (ref 12–18)
LYMPH%: 14.3 % (ref 12–44)
LYMPH: 0.8 10^3/UL (ref 0.8–2.8)
MCH: 28 PG (ref 26–33)
MCHC: 31 G/DL (ref 31–36)
MCV: 90.1 FML (ref 82–100)
MONO%: 9.4 % (ref 2–12)
MONO: 0.5 10^3/UL (ref 0.2–1)
MPV: 8.6 FML (ref 8.6–11.7)
NEUT%: 73.1 % (ref 47–76)
NEUT: 4.1 10^3/UL (ref 1.5–7.1)
PLT: 284 10^3/UL (ref 150–375)
RBC: 3.04 10^6/UL (ref 4.2–6.2)
RDW-CV: 17.6 %
RDW-SD: 55.6 FML (ref 36–50)
WBC: 5.7 10^3/UL (ref 4.3–11)

## 2020-10-15 ENCOUNTER — APPOINTMENT (OUTPATIENT)
Dept: CT IMAGING | Age: 80
End: 2020-10-15
Attending: PHYSICIAN ASSISTANT
Payer: MEDICARE

## 2020-10-15 ENCOUNTER — HOSPITAL ENCOUNTER (OUTPATIENT)
Age: 80
Discharge: HOME | End: 2020-10-15
Payer: MEDICARE

## 2020-10-15 VITALS
DIASTOLIC BLOOD PRESSURE: 52 MMHG | BODY MASS INDEX: 24.62 KG/M2 | OXYGEN SATURATION: 100 % | HEART RATE: 56 BPM | WEIGHT: 172 LBS | HEIGHT: 70 IN | SYSTOLIC BLOOD PRESSURE: 154 MMHG | TEMPERATURE: 98 F | RESPIRATION RATE: 16 BRPM

## 2020-10-15 DIAGNOSIS — K11.21 ACUTE PAROTITIS: ICD-10-CM

## 2020-10-15 DIAGNOSIS — N18.9 CHRONIC RENAL IMPAIRMENT, UNSPECIFIED CKD STAGE: Primary | ICD-10-CM

## 2020-10-15 DIAGNOSIS — R59.0 LAD (LYMPHADENOPATHY) OF RIGHT CERVICAL REGION: ICD-10-CM

## 2020-10-15 PROCEDURE — 36415 COLL VENOUS BLD VENIPUNCTURE: CPT

## 2020-10-15 PROCEDURE — 85025 COMPLETE CBC W/AUTO DIFF WBC: CPT | Performed by: PHYSICIAN ASSISTANT

## 2020-10-15 PROCEDURE — 80047 BASIC METABLC PNL IONIZED CA: CPT

## 2020-10-15 PROCEDURE — 70490 CT SOFT TISSUE NECK W/O DYE: CPT | Performed by: PHYSICIAN ASSISTANT

## 2020-10-15 PROCEDURE — 99215 OFFICE O/P EST HI 40 MIN: CPT

## 2020-10-15 RX ORDER — AMOXICILLIN AND CLAVULANATE POTASSIUM 875; 125 MG/1; MG/1
1 TABLET, FILM COATED ORAL 2 TIMES DAILY
Qty: 20 TABLET | Refills: 0 | Status: SHIPPED | OUTPATIENT
Start: 2020-10-15 | End: 2020-10-25

## 2020-10-15 NOTE — ED INITIAL ASSESSMENT (HPI)
5 months patient states has a \"lump like feeling to the right upper inside of this mouth lump\"  irritates with spicy foods

## 2020-12-31 ENCOUNTER — PRIOR ORIGINAL RECORDS (OUTPATIENT)
Dept: OTHER | Age: 80
End: 2020-12-31

## 2021-02-03 DIAGNOSIS — Z23 NEED FOR VACCINATION: ICD-10-CM

## 2021-02-11 ENCOUNTER — HOSPITAL ENCOUNTER (OUTPATIENT)
Age: 81
Discharge: HOME | End: 2021-02-11
Payer: MEDICARE

## 2021-02-11 VITALS
SYSTOLIC BLOOD PRESSURE: 157 MMHG | BODY MASS INDEX: 25 KG/M2 | HEART RATE: 57 BPM | RESPIRATION RATE: 16 BRPM | WEIGHT: 171.94 LBS | TEMPERATURE: 97 F | OXYGEN SATURATION: 100 % | DIASTOLIC BLOOD PRESSURE: 57 MMHG

## 2021-02-11 DIAGNOSIS — G50.0 TRIGEMINAL NEURALGIA OF RIGHT SIDE OF FACE: Primary | ICD-10-CM

## 2021-02-11 PROCEDURE — 99212 OFFICE O/P EST SF 10 MIN: CPT

## 2021-02-11 PROCEDURE — 99214 OFFICE O/P EST MOD 30 MIN: CPT

## 2021-02-11 NOTE — ED PROVIDER NOTES
Patient Seen in: [facility]      History   Patient presents with:  Sore Throat    Stated Complaint: SORE THROAT     HPI/Subjective:   HPI  CHIEF COMPLAINT: Right jaw, facial burning    HISTORY OF PRESENT ILLNESS: Patient is a pleasant 78- • Weight loss               Past Surgical History:   Procedure Laterality Date   • COLONOSCOPY     • EGD     • ENDOSCOPIC ULTRASOUND (EUS) N/A 10/25/2018    Performed by Elba Nugent MD at [address] [place] [address] ENDOSCOPY   • ESOPHAGOGASTRODUODENOSCOPY (EGD) N/A 2/18/20 Tonsils: No tonsillar exudate or tonsillar abscesses. Neck:      Musculoskeletal: Normal range of motion and neck supple. Cardiovascular:      Rate and Rhythm: Normal rate and regular rhythm. Heart sounds: Normal heart sounds.    Pulmonary: Trigeminal neuralgia of right side of face  (primary encounter diagnosis)    Disposition:  Discharge  2/11/2021  3:44 pm    Follow-up:  Emily Wilson, [address] [facility] [phone]    Call in 1 day  For reevaluation    Muqt

## 2021-02-16 ENCOUNTER — IMMUNIZATION (OUTPATIENT)
Dept: LAB | Age: 81
End: 2021-02-16
Attending: HOSPITALIST
Payer: MEDICARE

## 2021-02-16 DIAGNOSIS — Z23 NEED FOR VACCINATION: Primary | ICD-10-CM

## 2021-02-16 PROCEDURE — 0001A SARSCOV2 VAC 30MCG/0.3ML IM: CPT

## 2021-03-09 ENCOUNTER — IMMUNIZATION (OUTPATIENT)
Dept: LAB | Age: 81
End: 2021-03-09
Attending: HOSPITALIST
Payer: MEDICARE

## 2021-03-09 DIAGNOSIS — Z23 NEED FOR VACCINATION: Primary | ICD-10-CM

## 2021-03-09 PROCEDURE — 0002A SARSCOV2 VAC 30MCG/0.3ML IM: CPT

## 2021-05-12 ENCOUNTER — HOSPITAL ENCOUNTER (OUTPATIENT)
Age: 81
Discharge: HOME | End: 2021-05-12
Payer: MEDICARE

## 2021-05-12 VITALS
OXYGEN SATURATION: 100 % | DIASTOLIC BLOOD PRESSURE: 56 MMHG | RESPIRATION RATE: 17 BRPM | HEART RATE: 61 BPM | TEMPERATURE: 98 F | SYSTOLIC BLOOD PRESSURE: 158 MMHG

## 2021-05-12 DIAGNOSIS — L02.31 ABSCESS OF BUTTOCK, LEFT: Primary | ICD-10-CM

## 2021-05-12 PROCEDURE — 10061 I&D ABSCESS COMP/MULTIPLE: CPT

## 2021-05-12 PROCEDURE — 87077 CULTURE AEROBIC IDENTIFY: CPT | Performed by: PHYSICIAN ASSISTANT

## 2021-05-12 PROCEDURE — 87205 SMEAR GRAM STAIN: CPT | Performed by: PHYSICIAN ASSISTANT

## 2021-05-12 PROCEDURE — 87070 CULTURE OTHR SPECIMN AEROBIC: CPT | Performed by: PHYSICIAN ASSISTANT

## 2021-05-12 PROCEDURE — 87186 SC STD MICRODIL/AGAR DIL: CPT | Performed by: PHYSICIAN ASSISTANT

## 2021-05-12 PROCEDURE — 99213 OFFICE O/P EST LOW 20 MIN: CPT

## 2021-05-12 PROCEDURE — 99214 OFFICE O/P EST MOD 30 MIN: CPT

## 2021-05-12 RX ORDER — CHLORHEXIDINE GLUCONATE 4 G/100ML
30 SOLUTION TOPICAL
Qty: 1 BOTTLE | Refills: 0 | Status: SHIPPED | OUTPATIENT
Start: 2021-05-12 | End: 2021-08-23

## 2021-05-12 RX ORDER — CEPHALEXIN 500 MG/1
500 TABLET ORAL 4 TIMES DAILY
Qty: 40 TABLET | Refills: 0 | Status: SHIPPED | OUTPATIENT
Start: 2021-05-12 | End: 2021-05-22

## 2021-05-12 NOTE — ED PROVIDER NOTES
Patient Seen in: [facility]      History   Patient presents with:  Abscess    Stated Complaint: boil on buttocks    HPI/Subjective:   HPI    78-year-old male with a history of diabetes here with complaints of an abscess to the left aspect All other systems reviewed and negative except as noted above.     Physical Exam     ED Triage Vitals [05/12/21 1553]   /56   Pulse 61   Resp 17   Temp 98.1 °F (36.7 °C)   Temp src Oral   SpO2 100 %   O2 Device None (Room air)       Current: tissue  CultureSent:Yes  ProcedureStatus:without complaint  After discussing the risks, benefits and alternatives patient expresses understanding and verbal consent.   NOTE: Will send to surgery for F/U: Patient has been to the ER multiple times for the flor tablet Refills: 0    Chlorhexidine Gluconate 4 % External Liquid  Apply 30 mL topically daily as needed.   Qty: 1 Bottle Refills: 0

## 2021-05-12 NOTE — ED INITIAL ASSESSMENT (HPI)
Patient presents to IC with c/o boil to inner left buttocks x 6-7 days. No fever noted. +DM.h/o boils

## 2021-05-16 NOTE — ED NOTES
Pt is currently sleeping due to not feeling well from his cancer, family will call back with Pt when he wakes up

## 2021-05-17 ENCOUNTER — APPOINTMENT (OUTPATIENT)
Dept: ULTRASOUND IMAGING | Facility: HOSPITAL | Age: 81
End: 2021-05-17
Attending: EMERGENCY MEDICINE
Payer: MEDICARE

## 2021-05-17 ENCOUNTER — HOSPITAL ENCOUNTER (OUTPATIENT)
Facility: HOSPITAL | Age: 81
Setting detail: OBSERVATION
Discharge: ANOTHER HOSPITAL | End: 2021-05-18
Attending: EMERGENCY MEDICINE | Admitting: INTERNAL MEDICINE
Payer: MEDICARE

## 2021-05-17 DIAGNOSIS — N17.9 ACUTE KIDNEY INJURY (HCC): ICD-10-CM

## 2021-05-17 DIAGNOSIS — L05.01 PILONIDAL CYST WITH ABSCESS: Primary | ICD-10-CM

## 2021-05-17 DIAGNOSIS — C01 SQUAMOUS CELL CARCINOMA OF BASE OF TONGUE (HCC): ICD-10-CM

## 2021-05-17 DIAGNOSIS — D64.9 ANEMIA, UNSPECIFIED TYPE: ICD-10-CM

## 2021-05-17 DIAGNOSIS — E87.5 HYPERKALEMIA: ICD-10-CM

## 2021-05-17 PROCEDURE — 76770 US EXAM ABDO BACK WALL COMP: CPT | Performed by: EMERGENCY MEDICINE

## 2021-05-17 PROCEDURE — 99223 1ST HOSP IP/OBS HIGH 75: CPT | Performed by: INTERNAL MEDICINE

## 2021-05-17 RX ORDER — DEXTROSE MONOHYDRATE 25 G/50ML
50 INJECTION, SOLUTION INTRAVENOUS
Status: DISCONTINUED | OUTPATIENT
Start: 2021-05-17 | End: 2021-05-18

## 2021-05-17 RX ORDER — DEXTROSE MONOHYDRATE 25 G/50ML
50 INJECTION, SOLUTION INTRAVENOUS ONCE
Status: COMPLETED | OUTPATIENT
Start: 2021-05-17 | End: 2021-05-17

## 2021-05-17 RX ORDER — ONDANSETRON 2 MG/ML
4 INJECTION INTRAMUSCULAR; INTRAVENOUS EVERY 6 HOURS PRN
Status: DISCONTINUED | OUTPATIENT
Start: 2021-05-17 | End: 2021-05-18

## 2021-05-17 RX ORDER — MORPHINE SULFATE 4 MG/ML
4 INJECTION, SOLUTION INTRAMUSCULAR; INTRAVENOUS ONCE
Status: COMPLETED | OUTPATIENT
Start: 2021-05-17 | End: 2021-05-17

## 2021-05-17 RX ORDER — HYDROMORPHONE HYDROCHLORIDE 1 MG/ML
0.5 INJECTION, SOLUTION INTRAMUSCULAR; INTRAVENOUS; SUBCUTANEOUS EVERY 30 MIN PRN
Status: DISCONTINUED | OUTPATIENT
Start: 2021-05-17 | End: 2021-05-17

## 2021-05-17 RX ORDER — HEPARIN SODIUM 5000 [USP'U]/ML
5000 INJECTION, SOLUTION INTRAVENOUS; SUBCUTANEOUS EVERY 12 HOURS SCHEDULED
Status: DISCONTINUED | OUTPATIENT
Start: 2021-05-17 | End: 2021-05-18

## 2021-05-17 RX ORDER — SODIUM POLYSTYRENE SULFONATE 4.1 MEQ/G
15 POWDER, FOR SUSPENSION ORAL; RECTAL ONCE
Status: COMPLETED | OUTPATIENT
Start: 2021-05-17 | End: 2021-05-17

## 2021-05-17 RX ORDER — SODIUM CHLORIDE 9 MG/ML
INJECTION, SOLUTION INTRAVENOUS CONTINUOUS
Status: DISCONTINUED | OUTPATIENT
Start: 2021-05-17 | End: 2021-05-18

## 2021-05-17 RX ORDER — ONDANSETRON 2 MG/ML
4 INJECTION INTRAMUSCULAR; INTRAVENOUS EVERY 4 HOURS PRN
Status: DISCONTINUED | OUTPATIENT
Start: 2021-05-17 | End: 2021-05-17

## 2021-05-17 RX ORDER — CEFDINIR 300 MG/1
300 CAPSULE ORAL 2 TIMES DAILY
Qty: 20 CAPSULE | Refills: 0 | Status: SHIPPED | OUTPATIENT
Start: 2021-05-17 | End: 2021-05-18

## 2021-05-17 RX ORDER — ACETAMINOPHEN 325 MG/1
650 TABLET ORAL EVERY 6 HOURS PRN
Status: DISCONTINUED | OUTPATIENT
Start: 2021-05-17 | End: 2021-05-18

## 2021-05-17 RX ORDER — SODIUM POLYSTYRENE SULFONATE 4.1 MEQ/G
30 POWDER, FOR SUSPENSION ORAL; RECTAL ONCE
Status: COMPLETED | OUTPATIENT
Start: 2021-05-17 | End: 2021-05-17

## 2021-05-17 RX ORDER — HYDROCODONE BITARTRATE AND ACETAMINOPHEN 5; 325 MG/1; MG/1
1-2 TABLET ORAL EVERY 6 HOURS PRN
Qty: 10 TABLET | Refills: 0 | Status: SHIPPED | OUTPATIENT
Start: 2021-05-17 | End: 2021-05-18

## 2021-05-17 NOTE — ED INITIAL ASSESSMENT (HPI)
Patient admits to rectal abscess to left side x 8 days. Denies any fever, denies any drainage. Was seen at [address] [place] [address] on Friday and started on oral abx.

## 2021-05-17 NOTE — ED PROVIDER NOTES
Patient Seen in: [facility] Emergency Department      History   Patient presents with:  Abscess    Stated Complaint: abscess buttocks    HPI/Subjective:   HPI    78-year-old gentleman. Currently on chemotherapy.   History of type 2 diabetes, hyperte Triage Vitals [05/17/21 1645]   BP (!) 163/65   Pulse 64   Resp 16   Temp 97 °F (36.1 °C)   Temp src Temporal   SpO2 100 %   O2 Device None (Room air)       Current:/37   Pulse 83   Temp 97 °F (36.1 °C) (Temporal)   Resp 18   Ht 177.8 cm (5' 10\") W/ DIFFERENTIAL[[id number]]          Abnormal            Final result                 Please view results for these tests on the individual orders.    BLOOD CULTURE   AEROBIC BACTERIAL CULTURE                   MDM        Admission disposition: 5/17/2021  8: Impression:  Pilonidal cyst with abscess  (primary encounter diagnosis)  Hyperkalemia  Acute kidney injury ([facility]  Anemia, unspecified type  Squamous cell carcinoma of base of tongue ([facility]     Disposition:  Admit  5/17/2021  8:25 pm    Follow-up:  Joann Nichole

## 2021-05-17 NOTE — ED NOTES
Pt and wife called today. Pt is currently undergoing cancer treatment and he and his wife on phone.   Wife states it looks better and is drying up, pt states it is not as sore, pt is undergoing a treatment today, and will follow up with his doctor if it ge

## 2021-05-18 VITALS
BODY MASS INDEX: 21.24 KG/M2 | DIASTOLIC BLOOD PRESSURE: 50 MMHG | OXYGEN SATURATION: 99 % | RESPIRATION RATE: 16 BRPM | SYSTOLIC BLOOD PRESSURE: 157 MMHG | WEIGHT: 148.38 LBS | HEART RATE: 89 BPM | TEMPERATURE: 98 F | HEIGHT: 70 IN

## 2021-05-18 PROCEDURE — 99232 SBSQ HOSP IP/OBS MODERATE 35: CPT | Performed by: HOSPITALIST

## 2021-05-18 PROCEDURE — 99222 1ST HOSP IP/OBS MODERATE 55: CPT | Performed by: SURGERY

## 2021-05-18 PROCEDURE — 99223 1ST HOSP IP/OBS HIGH 75: CPT | Performed by: INTERNAL MEDICINE

## 2021-05-18 RX ORDER — VANCOMYCIN HYDROCHLORIDE 125 MG/1
125 CAPSULE ORAL DAILY
Status: DISCONTINUED | OUTPATIENT
Start: 2021-05-18 | End: 2021-05-18

## 2021-05-18 RX ORDER — MEMANTINE HYDROCHLORIDE 10 MG/1
10 TABLET ORAL DAILY
Status: DISCONTINUED | OUTPATIENT
Start: 2021-05-18 | End: 2021-05-18

## 2021-05-18 RX ORDER — TEMAZEPAM 15 MG/1
15 CAPSULE ORAL NIGHTLY PRN
Status: DISCONTINUED | OUTPATIENT
Start: 2021-05-18 | End: 2021-05-18

## 2021-05-18 RX ORDER — HYDRALAZINE HYDROCHLORIDE 20 MG/ML
10 INJECTION INTRAMUSCULAR; INTRAVENOUS EVERY 4 HOURS PRN
Status: DISCONTINUED | OUTPATIENT
Start: 2021-05-18 | End: 2021-05-18

## 2021-05-18 RX ORDER — DEXTROSE MONOHYDRATE 25 G/50ML
50 INJECTION, SOLUTION INTRAVENOUS ONCE
Status: COMPLETED | OUTPATIENT
Start: 2021-05-18 | End: 2021-05-18

## 2021-05-18 RX ORDER — AMLODIPINE BESYLATE 5 MG/1
5 TABLET ORAL 2 TIMES DAILY
Status: DISCONTINUED | OUTPATIENT
Start: 2021-05-18 | End: 2021-05-18

## 2021-05-18 RX ORDER — PANTOPRAZOLE SODIUM 40 MG/1
40 TABLET, DELAYED RELEASE ORAL
Status: DISCONTINUED | OUTPATIENT
Start: 2021-05-18 | End: 2021-05-18

## 2021-05-18 RX ORDER — MELATONIN
325 2 TIMES DAILY WITH MEALS
Status: DISCONTINUED | OUTPATIENT
Start: 2021-05-18 | End: 2021-05-18

## 2021-05-18 NOTE — H&P
[facility]                                                               History & Physical         [facility] Patient Status:  Emergency    1940 MRN [id number]   Location [address] [facility] Attending Reji Carrillo MD OTHER SURGICAL HISTORY      incision and drainage and skin graft-bilateral arms       Family history:  Family History   Problem Relation Age of Onset   • Diabetes Mother    • Hypertension Mother    • Diabetes Sister    • Sickle Cell Sister         Trait Lab Results   Component Value Date    WBC 11.4 05/17/2021    HGB 7.8 05/17/2021    HCT 24.2 05/17/2021    .0 05/17/2021    CREATSERUM 4.75 05/17/2021    BUN 69 05/17/2021     05/17/2021    K 6.9 05/17/2021     05/17/2021    CO2 21.0 05 NovoLog carb counting and correction factor  3. Essential hypertension  1. Continue home amlodipine and metoprolol   2. Home lisinopril on hold due to hyperkalemia on admission  3. Follow blood pressure  4. GERD  1. Continue home PPI  5. Hyperkalemia  1.  H

## 2021-05-18 NOTE — PROGRESS NOTES
Pharmacy Note: Renal dose adjustment of Roney Rolon is a 78year old male who has been prescribed Merrem 500mg IV every 8 hours.   CrCl is 11.4 ml/min so the dose has been adjusted  to 500mg IV every 12 hours per hospital renal dose adjustment p

## 2021-05-18 NOTE — ED QUICK NOTES
Pt reevluated by Sarah Torres, informed of his blood test reports and plan of care.  Pt will be admitted, verbalizing understanding

## 2021-05-18 NOTE — SLP NOTE
ADULT SWALLOWING EVALUATION    ASSESSMENT    ASSESSMENT/OVERALL IMPRESSION:  Patient seen for swallowing evaluation per protocol. Patient alert, quite pleasant and up in bed. He is agreeable to sit up for this assessment.   He is rotated to the right due Hyperkalemia    Acute kidney injury ([facility]    Anemia, unspecified type    Squamous cell carcinoma of base of tongue [facility])      Past Medical History  Past Medical History:   Diagnosis Date   • Anemia    • Cancer ([facility]    • Diabetes mellitus ([facility]    • Divert to follow up wtih OP SLP)     Follow Up Needed (Documentation Required): No       Thank you for your referral.   If you have any questions, please contact Lucas Davidson [phone]  Pager [phone]    Prior to entering room, SLP patrick appropr

## 2021-05-18 NOTE — CONSULTS
[facility]  Report of Consultation    Swathi Whitehead Patient Status:  Inpatient    1940 MRN [id number]   [facility] 3NE-A Attending Orestes Schulte MD   Hosp Day # 1 PCP Adam Corea MD     Reason for Consultation:  HAYDE/CKD  [facility] reports that he does not drink alcohol and does not use drugs.     Allergies:    Bactrim [Sulfametho*    SWELLING    Comment:Patient states that he does not think he is             allergic to this    Current Medications:    Current Facility-Administered Me CC  Home Medications:  cefdinir 300 MG Oral Cap, Take 1 capsule (300 mg total) by mouth 2 (two) times daily for 10 days. HYDROcodone-acetaminophen 5-325 MG Oral Tab, Take 1-2 tablets by mouth every 6 (six) hours as needed for Pain.         Review of System (H)         Imaging:  Reviewed    Impression:  1. HAYDE/CKD- baseline Cr ~ 2.5 (2/2020) - related to DM/HTN presumably. Renal US w/o any obstruction. He is noted to have chronic changes c/w medical renal disease.   Appears a bit volume depleted presently  - a

## 2021-05-18 NOTE — ED QUICK NOTES
Rounding Completed    Plan of Care reviewed. Waiting for bed assignment. Patient resting comfortably on cart. Patient voided using urinal.      Bed is locked and in lowest position. Call light within reach.

## 2021-05-18 NOTE — ED PROVIDER NOTES
78-year-old male who was seen by me as well as by the physician assistant who initially came in for a recurrent pilonidal abscess. He had incision and drainage done on approximately 5 days ago at the immediate care.   There was concern at that time that it to his hyperkalemia. This involved direct patient intervention, complex decision making, and/or extensive discussions with the patient, family, and clinical staff.

## 2021-05-18 NOTE — ED QUICK NOTES
Hospitalist Dr. Redding at bedside. 12 lead EKG done. Patient taken to ultrasound. Report given to \[facility]\"". Patient to go to floor when returns from ultrasound.

## 2021-05-18 NOTE — PLAN OF CARE
PT ADMITTED A/O, 99% ON RA, SR, SCDS ON, REFUSED LATER, GUAZE TO LOWER BACK, FEEDING TUBE CLAMPED, INTACT, VOIDING PER URINAL, IVF INFUSING, NPO EXCEPT MEDS, C/O OF TONGUE/BACK PAIN, GIVEN TYLENOL, ACCUCHECK-92, BED ALARM ON, INSTRUCTED PT ON POC    Proble

## 2021-05-18 NOTE — PLAN OF CARE
A/O x 4  Vital signs stable, on room air, NSR on tele  Pain management with PRN Tylenol   Contact and droplet isolation maintained. Speech Evaluation completed.   Sacral wound dressing to be cleaned with NS, wet to dry gauze dressing, and covered with 4x

## 2021-05-18 NOTE — CONSULTS
[facility]  Report of Consultation    Edith Alcala Patient Status:  Inpatient    1940 MRN [id number]   [facility] 3NE-A Attending Robson Pacheco MD   Hosp Day # 1 PCP Isha Huntley MD     Reason for Consultation:   We are eval • Cancer ([facility]    • Diabetes mellitus ([facility]    • Diverticulosis of large intestine    • Esophageal reflux    • Essential hypertension    • Frequent urination    • High blood pressure    • Hydradenitis    • Leaking of urine    • Mass of pancreas    • Lorenah Awruth Oral Tab EC  Take 1 tablet (325 mg total) by mouth 2 (two) times daily with meals. Qty: 90 tablet Refills: 0    metoprolol Tartrate 25 MG Oral Tab  Take 0.5 tablets (12.5 mg total) by mouth 2x Daily(Beta Blocker).   Qty: 180 tablet Refills: 0    amLODIPine appetite, diarrhea and vomiting  Genitourinary:  Negative for dysuria and hematuria  Hema/Lymph:  Negative for easy bleeding and easy bruising  Integumentary:  Positive for lower back pain surrounding I+D sites.   Musculoskeletal:  Negative for bone/joint s 0.2 05/17/2021    ALB 2.9 05/17/2021    TP 8.1 05/17/2021       Impression:  Patient Active Problem List:     Anemia     Weight loss, abnormal     Chronic renal impairment     Elevated PSA     Personal history of colonic polyps     Low TSH level     Type 2 tenderness at the site. He returned to the emergency room yesterday, May 17, where another drainage was performed. Since then, his tailbone feels much better. He denies fevers and chills.   He was admitted from the ER because he was found to have hyperka

## 2021-05-18 NOTE — CM/SW NOTE
Transfer to [place] initiated as requested by family for continuity of care d/t squamous cell cancer of the base of the tongue status post chemo and radiation treatment and follows up with oncology in [place] (Dr Ganga Lam #[phone]- oncology & Dr Lupita Halsted #[phone]

## 2021-05-18 NOTE — PROGRESS NOTES
[facility]  Progress Note     Adam Murphy Patient Status:  Inpatient    1940 MRN [id number]   [facility] 3NE-A Attending Elham Morales MD   Hosp Day # 1 PCP Breanna Cui MD     Chief Complaint: HAYDE/ CKD    S: Patient ha last 168 hours. Cardiac  No results for input(s): TROP, PBNP in the last 168 hours. Creatinine Kinase  No results for input(s): CK in the last 168 hours. Inflammatory Markers  No results for input(s): CRP, DRAKE, LDH, DDIMER in the last 168 hours. carcinoma of the base of the tongue  1.  Patient follows up with oncologist at [facility] and is on chemo and radiation    Plan of care: IVF  monitor renal function  Continue antibiotics  Sx consult    Quality:  · DVT Prophylaxis: heparin  · CODE status: full

## 2021-05-18 NOTE — PROGRESS NOTES
NURSING DISCHARGE NOTE    Discharged to Another hospital via Ambulance. Accompanied by Support staff  Belongings Taken by patient/family. Tele removed. Discharge paperwork given to the transport.

## 2021-05-18 NOTE — CM/SW NOTE
CM spoke with [facility] transfer center # [phone] who states patient has been accepted and will be transferred to [facility]. No definitive time received. RN to call [facility] ambulance with time once received at [id number].     Chart copied / PCS comple

## 2021-05-19 NOTE — PROGRESS NOTES
18:52 pm, patient is already discharged, lab called to report that the patient is positive for c.diff. Called receiving RN at [facility] at [phone] to report, no answer.

## 2021-05-20 NOTE — PAYOR COMM NOTE
--------------  ADMISSION REVIEW     Payor: [facility] [place] #:  [id number]  Authorization Number: [id number]    Admit date: 5/17/21  Admit time: 11:13 PM       Patient Seen in: [facility] Emergency Department    History   Patient Globulin  5.2 (*)     A/G Ratio 0.6 (*)     All other components within normal limits   CBC W/ DIFFERENTIAL - Abnormal; Notable for the following components:    WBC 11.4 (*)     RBC 2.74 (*)     HGB 7.8 (*)     HCT 24.2 (*)     RDW 17.8 (*)     RDW-SD admission through the hospitalist.  [facility] nephrology.     Disposition and Plan     Clinical Impression:  Pilonidal cyst with abscess  (primary encounter diagnosis)  Hyperkalemia  Acute kidney injury ([facility]  Anemia, unspecified type  Squamous cell carcino membranes. EOM-I. PERRL  Neck: No lymphadenopathy. No JVD. No carotid bruits. Respiratory: Clear to auscultation bilaterally. No wheezes. No rhonchi. Cardiovascular: S1, S2.  Regular rate and rhythm. No murmurs.  Equal pulses   Abdomen: Soft, nontender given from the emergency room  4. Follow BMP  6. Acute kidney injury on chronic kidney disease stage IV  1. IV fluids  2. Nephrology consult  7. Hyponatremia  1. IV fluids  2. Follow BMP in a.m.  8. Anemia of chronic kidney disease  1. Follow BMP in a.m.    The patient this morning reports pain surrounding the I+D site. He denies drainage overnight. He denies fevers, chills, nausea or vomiting. He denies changes to his bowel movements. His main concern today is if he can continue his cancer treatments. recurrent pilonidal abscess. The patient was incidentally found to be hyperkalemic with potassium of 6.9 and creatinine of 4.75. He is currently undergoing chemo and radiation for squamous cell cancer of the base of his tongue.  He is managed by Mirella Spence

## 2021-05-25 NOTE — PAYOR COMM NOTE
Discharge Notification    Patient Name: Cindy Pelaez: [address] [facility] #: [id number]  Authorization Number: [id number]  Admit Date/Time: 5/17/2021 5:10 PM  Discharge Date/Time: 5/18/2021 6:30 PM

## 2021-05-25 NOTE — DISCHARGE SUMMARY
[facility] [place] [facility]  DISCHARGE SUMMARY     Pham Gilman Patient Status:  Observation    1940 MRN [id number]   [facility] 3NE-A Attending No att. providers found   Hosp Day # 0 PCP Primo Long MD     Date of Admission: 2021  D history of head neck cancer, squamous cell carcinoma the base of the tongue and follows at [facility]. He requested transfer to [facility] and was transferred in stable condition for continuity of care.     Lace+ Score: 72  59-90 High Risk  29-58 Medium Risk  0-28 Tabs      Take 1 tablet by mouth daily. Quantity: 30 tablet  Refills: 0     Pantoprazole Sodium 40 MG Tbec  Commonly known as: PROTONIX      Take 1 tablet (40 mg total) by mouth 2 (two) times daily before meals.    Quantity: 60 tablet  Refills: 1     [place]

## 2021-06-02 PROCEDURE — 99284 EMERGENCY DEPT VISIT MOD MDM: CPT

## 2021-06-02 PROCEDURE — 96372 THER/PROPH/DIAG INJ SC/IM: CPT

## 2021-06-02 PROCEDURE — 96360 HYDRATION IV INFUSION INIT: CPT

## 2021-06-02 PROCEDURE — 96361 HYDRATE IV INFUSION ADD-ON: CPT

## 2021-06-03 ENCOUNTER — HOSPITAL ENCOUNTER (EMERGENCY)
Facility: HOSPITAL | Age: 81
Discharge: HOME | End: 2021-06-03
Attending: EMERGENCY MEDICINE
Payer: MEDICARE

## 2021-06-03 VITALS
DIASTOLIC BLOOD PRESSURE: 80 MMHG | TEMPERATURE: 99 F | HEIGHT: 69 IN | SYSTOLIC BLOOD PRESSURE: 163 MMHG | WEIGHT: 154 LBS | HEART RATE: 71 BPM | RESPIRATION RATE: 15 BRPM | BODY MASS INDEX: 22.81 KG/M2 | OXYGEN SATURATION: 99 %

## 2021-06-03 DIAGNOSIS — N18.4 STAGE 4 CHRONIC KIDNEY DISEASE (HCC): ICD-10-CM

## 2021-06-03 DIAGNOSIS — R73.9 HYPERGLYCEMIA: Primary | ICD-10-CM

## 2021-06-03 PROCEDURE — 82962 GLUCOSE BLOOD TEST: CPT

## 2021-06-03 PROCEDURE — 85025 COMPLETE CBC W/AUTO DIFF WBC: CPT | Performed by: EMERGENCY MEDICINE

## 2021-06-03 PROCEDURE — 80053 COMPREHEN METABOLIC PANEL: CPT | Performed by: EMERGENCY MEDICINE

## 2021-06-03 RX ORDER — INSULIN ASPART 100 [IU]/ML
0.2 INJECTION, SOLUTION INTRAVENOUS; SUBCUTANEOUS ONCE
Status: COMPLETED | OUTPATIENT
Start: 2021-06-03 | End: 2021-06-03

## 2021-06-03 NOTE — ED INITIAL ASSESSMENT (HPI)
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 Last chemo & radiation today. Denies any fever.

## 2021-06-03 NOTE — ED PROVIDER NOTES
Patient Seen in: [facility] Emergency Department      History   Patient presents with:  Hyperglycemia    Stated Complaint: blood sugar over 500    HPI/Subjective:   HPI    This is a pleasant 47-year-old male who presents to the emergency department over 500  Other systems are as noted in HPI. Constitutional and vital signs reviewed. All other systems reviewed and negative except as noted above. Physical Exam     ED Triage Vitals [06/03/21 0008]   BP (!) 163/64   Pulse 70   Resp 16   Temp 98. Lymphocyte Absolute 0.07 (*)     Monocyte Absolute 0.06 (*)     All other components within normal limits   CBC WITH DIFFERENTIAL WITH PLATELET    Narrative: The following orders were created for panel order CBC With Differential With Platelet.   Proced

## 2021-08-06 ENCOUNTER — HOSPITAL ENCOUNTER (EMERGENCY)
Facility: HOSPITAL | Age: 81
Discharge: HOME HEALTH | End: 2021-08-06
Attending: EMERGENCY MEDICINE
Payer: MEDICARE

## 2021-08-06 ENCOUNTER — APPOINTMENT (OUTPATIENT)
Dept: GENERAL RADIOLOGY | Facility: HOSPITAL | Age: 81
End: 2021-08-06
Attending: EMERGENCY MEDICINE
Payer: MEDICARE

## 2021-08-06 VITALS
SYSTOLIC BLOOD PRESSURE: 145 MMHG | RESPIRATION RATE: 18 BRPM | HEART RATE: 68 BPM | DIASTOLIC BLOOD PRESSURE: 70 MMHG | TEMPERATURE: 97 F | OXYGEN SATURATION: 98 %

## 2021-08-06 DIAGNOSIS — T85.528A DISLODGED GASTROSTOMY TUBE: Primary | ICD-10-CM

## 2021-08-06 PROCEDURE — 74018 RADEX ABDOMEN 1 VIEW: CPT | Performed by: EMERGENCY MEDICINE

## 2021-08-06 PROCEDURE — 99283 EMERGENCY DEPT VISIT LOW MDM: CPT

## 2021-08-06 PROCEDURE — 43762 RPLC GTUBE NO REVJ TRC: CPT

## 2021-08-06 PROCEDURE — 99284 EMERGENCY DEPT VISIT MOD MDM: CPT

## 2021-08-06 PROCEDURE — 96372 THER/PROPH/DIAG INJ SC/IM: CPT

## 2021-08-06 RX ORDER — MORPHINE SULFATE 4 MG/ML
4 INJECTION, SOLUTION INTRAMUSCULAR; INTRAVENOUS ONCE
Status: DISCONTINUED | OUTPATIENT
Start: 2021-08-06 | End: 2021-08-06

## 2021-08-06 RX ORDER — MORPHINE SULFATE 2 MG/ML
2 INJECTION, SOLUTION INTRAMUSCULAR; INTRAVENOUS ONCE
Status: COMPLETED | OUTPATIENT
Start: 2021-08-06 | End: 2021-08-06

## 2021-08-06 NOTE — ED PROVIDER NOTES
Patient Seen in: [facility] Emergency Department      History   Patient presents with:  Cath Tube Problem    Stated Complaint: g tube     HPI/Subjective:   HPI    This is an 13-year-old man, history of upper GI cancer, G-tube in place, here for eval Temporal   SpO2 98 %   O2 Device None (Room air)       Current:/76   Pulse 65   Temp 97.2 °F (36.2 °C) (Temporal)   Resp 17   SpO2 98%         Physical Exam        Physical Exam  Vitals signs and nursing note reviewed.    General:   Elderly man sittin MDM      G-tube inadvertently displaced, was replaced without difficulty.   DC home follow-up with PMD resume feedings as normal.                             Disposition and Plan     Clinical Impression:  Dislodged gastrostomy tube  (primary encoun

## 2021-08-07 ENCOUNTER — APPOINTMENT (OUTPATIENT)
Dept: GENERAL RADIOLOGY | Facility: HOSPITAL | Age: 81
End: 2021-08-07
Attending: EMERGENCY MEDICINE
Payer: MEDICARE

## 2021-08-07 ENCOUNTER — HOSPITAL ENCOUNTER (EMERGENCY)
Facility: HOSPITAL | Age: 81
Discharge: HOME | End: 2021-08-07
Attending: EMERGENCY MEDICINE
Payer: MEDICARE

## 2021-08-07 VITALS
HEIGHT: 69 IN | OXYGEN SATURATION: 97 % | SYSTOLIC BLOOD PRESSURE: 121 MMHG | TEMPERATURE: 98 F | BODY MASS INDEX: 22.36 KG/M2 | RESPIRATION RATE: 18 BRPM | WEIGHT: 151 LBS | DIASTOLIC BLOOD PRESSURE: 61 MMHG | HEART RATE: 103 BPM

## 2021-08-07 DIAGNOSIS — T85.598A FEEDING TUBE DYSFUNCTION, INITIAL ENCOUNTER: Primary | ICD-10-CM

## 2021-08-07 PROCEDURE — 99283 EMERGENCY DEPT VISIT LOW MDM: CPT

## 2021-08-07 PROCEDURE — 43762 RPLC GTUBE NO REVJ TRC: CPT

## 2021-08-07 PROCEDURE — 74018 RADEX ABDOMEN 1 VIEW: CPT | Performed by: EMERGENCY MEDICINE

## 2021-08-07 RX ORDER — ONDANSETRON 4 MG/1
4 TABLET, ORALLY DISINTEGRATING ORAL ONCE
Status: COMPLETED | OUTPATIENT
Start: 2021-08-07 | End: 2021-08-07

## 2021-08-07 NOTE — ED INITIAL ASSESSMENT (HPI)
Hx of mouth cancer, states that the new one they placed yesterday does not fit. Patient has not been able to get his feedings and  medications today.

## 2021-08-07 NOTE — ED PROVIDER NOTES
Patient Seen in: [facility] Emergency Department      History   Patient presents with:  Cath Tube Problem    Stated Complaint: g tube     HPI/Subjective:   HPI    This is an 47-year-old man history of oral cancer with G-tube in place here for G-tube noted in HPI. Constitutional and vital signs reviewed. All other systems reviewed and negative except as noted above.     Physical Exam     ED Triage Vitals [08/07/21 1313]   /61   Pulse 103   Resp 18   Temp 97.8 °F (36.6 °C)   Temp src Temporal 8/6/2021  PROCEDURE:  XR ABDOMEN (1 VIEW) (CPT=74018)  INDICATIONS:  g tube  COMPARISON:  None. TECHNIQUE:  Supine AP view was obtained. PATIENT STATED HISTORY: (As transcribed by Technologist)  Patient offered no additional history at this time.        Yogesh Wheeler

## 2021-08-23 ENCOUNTER — APPOINTMENT (OUTPATIENT)
Dept: CT IMAGING | Facility: HOSPITAL | Age: 81
DRG: 147 | End: 2021-08-23
Attending: EMERGENCY MEDICINE
Payer: MEDICARE

## 2021-08-23 ENCOUNTER — APPOINTMENT (OUTPATIENT)
Dept: GENERAL RADIOLOGY | Facility: HOSPITAL | Age: 81
DRG: 147 | End: 2021-08-23
Attending: EMERGENCY MEDICINE
Payer: MEDICARE

## 2021-08-23 ENCOUNTER — HOSPITAL ENCOUNTER (INPATIENT)
Facility: HOSPITAL | Age: 81
LOS: 2 days | Discharge: HOME HEALTH | DRG: 147 | End: 2021-08-25
Attending: EMERGENCY MEDICINE | Admitting: SPECIALIST
Payer: MEDICARE

## 2021-08-23 DIAGNOSIS — D50.0 IRON DEFICIENCY ANEMIA SECONDARY TO BLOOD LOSS (CHRONIC): ICD-10-CM

## 2021-08-23 DIAGNOSIS — R04.2 HEMOPTYSIS: Primary | ICD-10-CM

## 2021-08-23 LAB
ALBUMIN SERPL-MCNC: 3.4 G/DL (ref 3.4–5)
ALBUMIN/GLOB SERPL: 0.7 {RATIO} (ref 1–2)
ALP LIVER SERPL-CCNC: 50 U/L
ALT SERPL-CCNC: 26 U/L
ANION GAP SERPL CALC-SCNC: 4 MMOL/L (ref 0–18)
APTT PPP: 24.5 SECONDS (ref 25.4–36.1)
AST SERPL-CCNC: 24 U/L (ref 15–37)
BASOPHILS # BLD AUTO: 0.03 X10(3) UL (ref 0–0.2)
BASOPHILS NFR BLD AUTO: 0.4 %
BILIRUB SERPL-MCNC: 0.3 MG/DL (ref 0.1–2)
BUN BLD-MCNC: 102 MG/DL (ref 7–18)
CALCIUM BLD-MCNC: 11.1 MG/DL (ref 8.5–10.1)
CHLORIDE SERPL-SCNC: 102 MMOL/L (ref 98–112)
CO2 SERPL-SCNC: 30 MMOL/L (ref 21–32)
CREAT BLD-MCNC: 3.87 MG/DL
D-DIMER: 0.39 UG/ML FEU (ref ?–0.83)
EOSINOPHIL # BLD AUTO: 0.21 X10(3) UL (ref 0–0.7)
EOSINOPHIL NFR BLD AUTO: 2.7 %
ERYTHROCYTE [DISTWIDTH] IN BLOOD BY AUTOMATED COUNT: 17 %
EST. AVERAGE GLUCOSE BLD GHB EST-MCNC: 160 MG/DL (ref 68–126)
GLOBULIN PLAS-MCNC: 5.2 G/DL (ref 2.8–4.4)
GLUCOSE BLD-MCNC: 175 MG/DL (ref 70–99)
GLUCOSE BLD-MCNC: 195 MG/DL (ref 70–99)
GLUCOSE BLD-MCNC: 216 MG/DL (ref 70–99)
GLUCOSE BLD-MCNC: 219 MG/DL (ref 70–99)
GLUCOSE BLD-MCNC: 234 MG/DL (ref 70–99)
HBA1C MFR BLD HPLC: 7.2 % (ref ?–5.7)
HCT VFR BLD AUTO: 25.4 %
HGB BLD-MCNC: 7.7 G/DL
IMM GRANULOCYTES # BLD AUTO: 0.04 X10(3) UL (ref 0–1)
IMM GRANULOCYTES NFR BLD: 0.5 %
INR BLD: 1.06 (ref 0.89–1.11)
LYMPHOCYTES # BLD AUTO: 0.43 X10(3) UL (ref 1–4)
LYMPHOCYTES NFR BLD AUTO: 5.6 %
M PROTEIN MFR SERPL ELPH: 8.6 G/DL (ref 6.4–8.2)
MCH RBC QN AUTO: 30.2 PG (ref 26–34)
MCHC RBC AUTO-ENTMCNC: 30.3 G/DL (ref 31–37)
MCV RBC AUTO: 99.6 FL
MONOCYTES # BLD AUTO: 0.79 X10(3) UL (ref 0.1–1)
MONOCYTES NFR BLD AUTO: 10.2 %
NEUTROPHILS # BLD AUTO: 6.24 X10 (3) UL (ref 1.5–7.7)
NEUTROPHILS # BLD AUTO: 6.24 X10(3) UL (ref 1.5–7.7)
NEUTROPHILS NFR BLD AUTO: 80.6 %
OSMOLALITY SERPL CALC.SUM OF ELEC: 321 MOSM/KG (ref 275–295)
PLATELET # BLD AUTO: 285 10(3)UL (ref 150–450)
POTASSIUM SERPL-SCNC: 4.7 MMOL/L (ref 3.5–5.1)
PSA SERPL DL<=0.01 NG/ML-MCNC: 14 SECONDS (ref 12.2–14.5)
RBC # BLD AUTO: 2.55 X10(6)UL
SARS-COV-2 RNA RESP QL NAA+PROBE: NOT DETECTED
SODIUM SERPL-SCNC: 136 MMOL/L (ref 136–145)
WBC # BLD AUTO: 7.7 X10(3) UL (ref 4–11)

## 2021-08-23 PROCEDURE — 71250 CT THORAX DX C-: CPT | Performed by: EMERGENCY MEDICINE

## 2021-08-23 PROCEDURE — 0CJS8ZZ INSPECTION OF LARYNX, VIA NATURAL OR ARTIFICIAL OPENING ENDOSCOPIC: ICD-10-PCS | Performed by: OTOLARYNGOLOGY

## 2021-08-23 PROCEDURE — 71045 X-RAY EXAM CHEST 1 VIEW: CPT | Performed by: EMERGENCY MEDICINE

## 2021-08-23 RX ORDER — AZITHROMYCIN 250 MG/1
500 TABLET, FILM COATED ORAL ONCE
Status: COMPLETED | OUTPATIENT
Start: 2021-08-23 | End: 2021-08-23

## 2021-08-23 RX ORDER — ENOXAPARIN SODIUM 100 MG/ML
40 INJECTION SUBCUTANEOUS DAILY
Status: DISCONTINUED | OUTPATIENT
Start: 2021-08-23 | End: 2021-08-23

## 2021-08-23 RX ORDER — PROCHLORPERAZINE MALEATE 10 MG
10 TABLET ORAL EVERY 6 HOURS PRN
Status: DISCONTINUED | OUTPATIENT
Start: 2021-08-23 | End: 2021-08-25

## 2021-08-23 RX ORDER — AZITHROMYCIN 250 MG/1
250 TABLET, FILM COATED ORAL
Status: DISCONTINUED | OUTPATIENT
Start: 2021-08-24 | End: 2021-08-25

## 2021-08-23 RX ORDER — DOCUSATE SODIUM 100 MG/1
100 CAPSULE, LIQUID FILLED ORAL
COMMUNITY

## 2021-08-23 RX ORDER — FERROUS SULFATE 300 MG/5ML
300 LIQUID (ML) ORAL
Status: DISCONTINUED | OUTPATIENT
Start: 2021-08-23 | End: 2021-08-25

## 2021-08-23 RX ORDER — ONDANSETRON 2 MG/ML
4 INJECTION INTRAMUSCULAR; INTRAVENOUS EVERY 4 HOURS PRN
Status: ACTIVE | OUTPATIENT
Start: 2021-08-23 | End: 2021-08-23

## 2021-08-23 RX ORDER — MELATONIN
325
COMMUNITY

## 2021-08-23 RX ORDER — MEMANTINE HYDROCHLORIDE 10 MG/1
10 TABLET ORAL DAILY
Status: DISCONTINUED | OUTPATIENT
Start: 2021-08-23 | End: 2021-08-25

## 2021-08-23 RX ORDER — VANCOMYCIN HYDROCHLORIDE 125 MG/1
125 CAPSULE ORAL DAILY
Status: DISCONTINUED | OUTPATIENT
Start: 2021-08-23 | End: 2021-08-23 | Stop reason: SDUPTHER

## 2021-08-23 RX ORDER — DEXTROSE MONOHYDRATE 25 G/50ML
50 INJECTION, SOLUTION INTRAVENOUS
Status: DISCONTINUED | OUTPATIENT
Start: 2021-08-23 | End: 2021-08-25

## 2021-08-23 RX ORDER — MULTIPLE VITAMINS W/ MINERALS TAB 9MG-400MCG
1 TAB ORAL DAILY
Status: DISCONTINUED | OUTPATIENT
Start: 2021-08-23 | End: 2021-08-25

## 2021-08-23 RX ORDER — VITAMIN B COMPLEX
CAPSULE ORAL DAILY
Status: DISCONTINUED | OUTPATIENT
Start: 2021-08-23 | End: 2021-08-23 | Stop reason: SDUPTHER

## 2021-08-23 RX ORDER — FENTANYL 25 UG/H
1 PATCH TRANSDERMAL
Status: DISCONTINUED | OUTPATIENT
Start: 2021-08-23 | End: 2021-08-25

## 2021-08-23 RX ORDER — FOLIC ACID/VIT B COMPLEX AND C 400 MCG
1 TABLET ORAL DAILY
Status: DISCONTINUED | OUTPATIENT
Start: 2021-08-23 | End: 2021-08-25

## 2021-08-23 RX ORDER — METHION/INOS/CHOL BT/B COM/LIV 110MG-86MG
1 CAPSULE ORAL DAILY
COMMUNITY
Start: 2021-05-22

## 2021-08-23 RX ORDER — CHOLECALCIFEROL (VITAMIN D3) 125 MCG
2000 CAPSULE ORAL DAILY
Status: DISCONTINUED | OUTPATIENT
Start: 2021-08-23 | End: 2021-08-25

## 2021-08-23 RX ORDER — DOCUSATE SODIUM 100 MG/1
100 CAPSULE, LIQUID FILLED ORAL
Status: DISCONTINUED | OUTPATIENT
Start: 2021-08-23 | End: 2021-08-25

## 2021-08-23 RX ORDER — ONDANSETRON 2 MG/ML
4 INJECTION INTRAMUSCULAR; INTRAVENOUS ONCE
Status: COMPLETED | OUTPATIENT
Start: 2021-08-23 | End: 2021-08-23

## 2021-08-23 RX ORDER — FENTANYL 25 UG/H
1 PATCH TRANSDERMAL
COMMUNITY

## 2021-08-23 RX ORDER — PROCHLORPERAZINE MALEATE 10 MG
10 TABLET ORAL EVERY 6 HOURS PRN
COMMUNITY
Start: 2021-08-21

## 2021-08-23 RX ORDER — INSULIN ASPART 100 [IU]/ML
INJECTION, SOLUTION INTRAVENOUS; SUBCUTANEOUS
COMMUNITY
Start: 2021-06-04

## 2021-08-23 RX ORDER — SCOLOPAMINE TRANSDERMAL SYSTEM 1 MG/1
1 PATCH, EXTENDED RELEASE TRANSDERMAL
Status: DISCONTINUED | OUTPATIENT
Start: 2021-08-23 | End: 2021-08-25

## 2021-08-23 RX ORDER — SCOLOPAMINE TRANSDERMAL SYSTEM 1 MG/1
1 PATCH, EXTENDED RELEASE TRANSDERMAL
COMMUNITY
Start: 2021-08-04

## 2021-08-23 RX ORDER — MELATONIN
100 DAILY
Status: DISCONTINUED | OUTPATIENT
Start: 2021-08-23 | End: 2021-08-25

## 2021-08-23 RX ORDER — SODIUM CHLORIDE 450 MG/100ML
INJECTION, SOLUTION INTRAVENOUS CONTINUOUS
Status: DISCONTINUED | OUTPATIENT
Start: 2021-08-23 | End: 2021-08-23

## 2021-08-23 NOTE — DIETARY NOTE
[facility]    NUTRITION ASSESSMENT    Pt does not meet malnutrition criteria. NUTRITION INTERVENTION:    1.  Enteral Nutrition - Recommend starting Nepro 1.8 at 40 ml/hour advancing 10 ml/hour q 4-6 hours to goal rate of 50 ml/hour if pt can janeth No  Cultural/Ethnic/Orthodoxy Preferences Addresses: Yes    GI SYSTEM REVIEW: swallowing dysfunction    NUTRITION RELATED PHYSICAL FINDINGS:     1. Body Fat/Muscle Mass: DANIEL, pt in pain at time of visit.      2. Fluid Accumulation: none per RN documentation

## 2021-08-23 NOTE — H&P
[address] [facility] Patient Status:  Inpatient    1940 MRN [id number]   [facility] 4NW-A Attending Nani Peña MD   Hosp Day # 0 PCP Javi Montanez MD     Date:  2021  Date of Admission: alcohol and does not use drugs.     Allergies:    Bactrim [Sulfametho*    SWELLING    Comment:Patient states that he does not think he is             allergic to this    Home Medications:  HYDROcodone-acetaminophen 7.5-325 MG/15ML Oral Solution, Take 15 mL (COMPAZINE) 10 mg tablet, Take 10 mg by mouth every 6 (six) hours as needed. , Disp: , Rfl:   Thiamine HCl (B-1) 100 MG Oral Tab, Take 1 tablet by mouth daily. , Disp: , Rfl:         Review of Systems:  Pertinent items are noted in HPI.     Physical Exam:  BP WBC 7.7 08/23/2021    HGB 7.7 08/23/2021    HCT 25.4 08/23/2021    .0 08/23/2021    CREATSERUM 3.87 08/23/2021     08/23/2021     08/23/2021    K 4.7 08/23/2021     08/23/2021    CO2 30.0 08/23/2021     08/23/2021    CA 11. Need for Inpatient Hospitalization - Initial Certification    Patient will require inpatient services that will reasonably be expected to span two midnight's based on the clinical documentation in H+P.    Based on patients current state of illness, I antici

## 2021-08-23 NOTE — PLAN OF CARE
Patient arrived to unit from ER via transport and cart, transferred to bed via 0800. Bed in lowest position all items in reach. Call don't fall. Patient resting, no current hemoptysis noted. Page to Dr. Jamshid Still at [facility] to inform of admission.  Return call rec

## 2021-08-23 NOTE — ED PROVIDER NOTES
Patient Seen in: [facility] Emergency Department      History   Patient presents with:  Hemoptysis    Stated Complaint: hx mouth/throat cancer, hemoptysis since 1000    HPI/Subjective:   HPI    41-year-old male with past medical history of squamous since 1000  Other systems are as noted in HPI. Constitutional and vital signs reviewed. All other systems reviewed and negative except as noted above.     Physical Exam     ED Triage Vitals [08/23/21 0205]   /55   Pulse 73   Resp 20   Temp 97.4 Notable for the following components:    PTT 24.5 (*)     All other components within normal limits   CBC W/ DIFFERENTIAL - Abnormal; Notable for the following components:    RBC 2.55 (*)     HGB 7.7 (*)     HCT 25.4 (*)     MCHC 30.3 (*)     Lymphocyte Ab baseline. He has remained hemodynamically stable and satting well on room air. He has had persistent small clots the he expectorates with coughing. There is no sign of any airway compromise. I reviewed case with Dr. Glee Nissen of pulmonology.   He requested

## 2021-08-23 NOTE — SLP NOTE
ADULT SWALLOWING EVALUATION    ASSESSMENT    ASSESSMENT/OVERALL IMPRESSION:  Patient seen for swallowing evaluation per protocol as he has a history of dysphagia related to his head and neck cancer diagnosis.   He has a PEG tube for primary means of nutriti • Anemia    • Cancer (HCC)    • Diabetes mellitus ([facility]    • Diverticulosis of large intestine    • Esophageal reflux    • Essential hypertension    • Frequent urination    • High blood pressure    • Hydradenitis    • Leaking of urine    • Mass of pancre Casi Vital MD on 8/23/2021 at 8:43 AM       SUBJECTIVE       OBJECTIVE   ORAL MOTOR EXAMINATION  Dentition: Functional  Symmetry: Within Functional Limits  Strength:  (limited exam due to oral pain)  Tone: Within Functional Limits  Range of Motion:  (generall

## 2021-08-23 NOTE — CONSULTS
Pulmonary H&P/Consult       NAME: Ted Olivo - ROOM: [address] [place] [address] - MRN: [id number] - Age: 78year old - :  1940    Date of Admission: 2021  1:47 AM  Admission Diagnosis: Hemoptysis [R04.2]  Reason for consult: hemoptysis    History of Present I COMPLEX OR), Take 1 tablet by mouth daily. , Disp: , Rfl: , 8/22/2021 at Unknown time  Scopolamine 1.5mg TD patch 1mg/3days, Place 1 patch onto the skin every third day., Disp: , Rfl: , Past Week at Unknown time  fentaNYL 25 MCG/HR Transdermal Patch 72 Hr, Marital status:       Spouse name: Not on file      Number of children: Not on file      Years of education: Not on file      Highest education level: Not on file    Occupational History      Not on file    Tobacco Use      Smoking status: Former Sm Pain., Disp: , Rfl:   B Complex Vitamins (B COMPLEX OR), Take 1 tablet by mouth daily. , Disp: , Rfl:   Scopolamine 1.5mg TD patch 1mg/3days, Place 1 patch onto the skin every third day., Disp: , Rfl:   fentaNYL 25 MCG/HR Transdermal Patch 72 Hr, Place 1 pa 125 mg Oral Daily   • azithromycin  250 mg Oral Daily     Continuous Infusing Medication:  • sodium chloride       PRN Medication:glucose **OR** glucose-vitamin C **OR** dextrose **OR** glucose **OR** glucose-vitamin C, docusate sodium, HYDROcodone-acetam respirations unlabored   Chest wall:    No tenderness or deformity   Heart:    Regular rate and rhythm, S1 and S2 normal, no murmur, rub   or gallop   Abdomen:     Soft, non-tender, bowel sounds active all four quadrants,     no masses, no organomegaly   E

## 2021-08-24 LAB
ALBUMIN SERPL-MCNC: 2.7 G/DL (ref 3.4–5)
ALBUMIN/GLOB SERPL: 0.6 {RATIO} (ref 1–2)
ALP LIVER SERPL-CCNC: 45 U/L
ALT SERPL-CCNC: 23 U/L
ANION GAP SERPL CALC-SCNC: 5 MMOL/L (ref 0–18)
ANTIBODY SCREEN: NEGATIVE
APTT PPP: 23.5 SECONDS (ref 25.4–36.1)
AST SERPL-CCNC: 23 U/L (ref 15–37)
BASOPHILS # BLD AUTO: 0.02 X10(3) UL (ref 0–0.2)
BASOPHILS NFR BLD AUTO: 0.2 %
BETA 2 GLYCOPROTEIN 1 AB, IGG: 1.3 U/ML (ref ?–7)
BETA 2 GLYCOPROTEIN 1 AB, IGM: <2.9 U/ML (ref ?–7)
BILIRUB SERPL-MCNC: 0.2 MG/DL (ref 0.1–2)
BILIRUB UR QL STRIP.AUTO: NEGATIVE
BUN BLD-MCNC: 103 MG/DL (ref 7–18)
CALCIUM BLD-MCNC: 10.6 MG/DL (ref 8.5–10.1)
CARDIOLIPIN IGG SERPL-ACNC: 1.3 GPL (ref ?–10)
CARDIOLIPIN IGM SERPL-ACNC: <0.8 MPL (ref ?–10)
CHLORIDE SERPL-SCNC: 106 MMOL/L (ref 98–112)
CO2 SERPL-SCNC: 29 MMOL/L (ref 21–32)
COLOR UR AUTO: YELLOW
CREAT BLD-MCNC: 3.8 MG/DL
DRVVT LUPUS ANTICOAGULANT: NEGATIVE
DRVVT SCREEN RATIO: 0.97 (ref 0–1.29)
EOSINOPHIL # BLD AUTO: 0.17 X10(3) UL (ref 0–0.7)
EOSINOPHIL NFR BLD AUTO: 1.8 %
ERYTHROCYTE [DISTWIDTH] IN BLOOD BY AUTOMATED COUNT: 16.9 %
GLOBULIN PLAS-MCNC: 4.3 G/DL (ref 2.8–4.4)
GLUCOSE BLD-MCNC: 122 MG/DL (ref 70–99)
GLUCOSE BLD-MCNC: 180 MG/DL (ref 70–99)
GLUCOSE BLD-MCNC: 183 MG/DL (ref 70–99)
GLUCOSE BLD-MCNC: 183 MG/DL (ref 70–99)
GLUCOSE BLD-MCNC: 195 MG/DL (ref 70–99)
GLUCOSE UR STRIP.AUTO-MCNC: 50 MG/DL
HCT VFR BLD AUTO: 20.7 %
HGB BLD-MCNC: 6.2 G/DL
IMM GRANULOCYTES # BLD AUTO: 0.04 X10(3) UL (ref 0–1)
IMM GRANULOCYTES NFR BLD: 0.4 %
KETONES UR STRIP.AUTO-MCNC: NEGATIVE MG/DL
LEUKOCYTE ESTERASE UR QL STRIP.AUTO: NEGATIVE
LYMPHOCYTES # BLD AUTO: 0.27 X10(3) UL (ref 1–4)
LYMPHOCYTES NFR BLD AUTO: 2.8 %
M PROTEIN MFR SERPL ELPH: 7 G/DL (ref 6.4–8.2)
MCH RBC QN AUTO: 30.2 PG (ref 26–34)
MCHC RBC AUTO-ENTMCNC: 30 G/DL (ref 31–37)
MCV RBC AUTO: 101 FL
MONOCYTES # BLD AUTO: 0.94 X10(3) UL (ref 0.1–1)
MONOCYTES NFR BLD AUTO: 9.8 %
NEUTROPHILS # BLD AUTO: 8.18 X10 (3) UL (ref 1.5–7.7)
NEUTROPHILS # BLD AUTO: 8.18 X10(3) UL (ref 1.5–7.7)
NEUTROPHILS NFR BLD AUTO: 85 %
NITRITE UR QL STRIP.AUTO: NEGATIVE
OSMOLALITY SERPL CALC.SUM OF ELEC: 327 MOSM/KG (ref 275–295)
PH UR STRIP.AUTO: 6 [PH] (ref 5–8)
PLATELET # BLD AUTO: 258 10(3)UL (ref 150–450)
POTASSIUM SERPL-SCNC: 4.5 MMOL/L (ref 3.5–5.1)
PROT UR STRIP.AUTO-MCNC: 100 MG/DL
PT(LUPUS): 13.6 SECONDS (ref 12.2–14.5)
RBC # BLD AUTO: 2.05 X10(6)UL
RBC UR QL AUTO: NEGATIVE
RH BLOOD TYPE: POSITIVE
SODIUM SERPL-SCNC: 140 MMOL/L (ref 136–145)
SP GR UR STRIP.AUTO: 1.01 (ref 1–1.03)
STACLOT LA DELTA: 2.5 SECONDS (ref ?–8)
STACLOT LA: NEGATIVE
UROBILINOGEN UR STRIP.AUTO-MCNC: <2 MG/DL
WBC # BLD AUTO: 9.6 X10(3) UL (ref 4–11)

## 2021-08-24 PROCEDURE — 99223 1ST HOSP IP/OBS HIGH 75: CPT | Performed by: INTERNAL MEDICINE

## 2021-08-24 PROCEDURE — 30233N1 TRANSFUSION OF NONAUTOLOGOUS RED BLOOD CELLS INTO PERIPHERAL VEIN, PERCUTANEOUS APPROACH: ICD-10-PCS | Performed by: INTERNAL MEDICINE

## 2021-08-24 RX ORDER — SODIUM CHLORIDE 9 MG/ML
INJECTION, SOLUTION INTRAVENOUS ONCE
Status: COMPLETED | OUTPATIENT
Start: 2021-08-24 | End: 2021-08-24

## 2021-08-24 RX ORDER — ACETAMINOPHEN 325 MG/1
650 TABLET ORAL ONCE
Status: COMPLETED | OUTPATIENT
Start: 2021-08-24 | End: 2021-08-24

## 2021-08-24 RX ORDER — DIPHENHYDRAMINE HCL 25 MG
25 CAPSULE ORAL ONCE
Status: COMPLETED | OUTPATIENT
Start: 2021-08-24 | End: 2021-08-24

## 2021-08-24 RX ORDER — SODIUM CHLORIDE 9 MG/ML
INJECTION, SOLUTION INTRAVENOUS CONTINUOUS
Status: DISCONTINUED | OUTPATIENT
Start: 2021-08-24 | End: 2021-08-25

## 2021-08-24 NOTE — PLAN OF CARE
Alert and oriented, out of bed to the bathroom with assist, activity was tolerated, no SOB was noted. Feels tired, hemoglobin was down to 6.2, MD was notified, 2 units of PRBC was ordered.  Has on and off productive cough with blood tinged to brownish sputu

## 2021-08-24 NOTE — PAYOR COMM NOTE
--------------  ADMISSION REVIEW     Payor: [facility] [place] #:  [id number]  Authorization Number: [id number]       ED Provider Notes        History   Patient presents with:  Hemoptysis    Stated Complaint: hx mouth/throat cancer, hemo Resp 20   Temp 97.4 °F (36.3 °C)   Temp src Temporal   SpO2 99 %   O2 Device None (Room air)       Current:BP (S) (!) 183/77   Pulse 71   Temp 97.4 °F (36.3 °C) (Temporal)   Resp 14   Ht 175.3 cm (5' 9\")   Wt 72.1 kg   SpO2 96%   BMI 23.48 kg/m² (*)     Lymphocyte Absolute 0.43 (*)     All other components within normal limits   PROTHROMBIN TIME (PT) - Normal   D-DIMER - Normal   RAPID SARS-COV-2 BY PCR - Normal   CBC WITH DIFFERENTIAL WITH PLATELET    Narrative:         MDM      68-year-old male Sleepy but responded to touch & verbal stimuli . cooperative, no distress, appears stated age   Head:    Normocephalic, without obvious abnormality, atraumatic   Eyes:    PERRL, conjunctiva/corneas clear, EOM's intact, fundi     benign, both eyes        Ear 0.39 08/23/2021              Imaging:  CT Scan   CONCLUSION:         There are subtle ground-glass densities in the left lower lobe identified.  This may represent subtle developing consolidation in this region.  To lesser extent this is noted in the right describing I do not believe the hazy changes in the left lung base are sufficient to cause this.   Given his history I think it is more likely that he is having bleeding from the back of his tongue where his cancer is which is draining down the back of his proceeding  2.  Bullae  -noted in the lung apices jacek  -relatively small  -most likely due to h/o smoking  -no intervention needed at this time  -consider PFTs in the future should dyspnea develop        8/24 INT MED    Plan:   Appreciate pulm inputs , bron 8/23/2021 1803 New Bag 1 g Intravenous Betty Ann, RN      fentaNYL (DURAGESIC) 25 MCG/HR 1 patch     Date Action Dose Route User    8/23/2021 1822 Fentanyl Patch Applied 1 patch Transdermal (Left Upper Back) Lexx Oropeza, RN      HYDROcodone-acetaminoph thiamine (Vitamin B-1) tab 100 mg     Date Action Dose Route User    8/23/2021 1202 Given 100 mg Per G Tube Betty Ann, CUHNG      vancomycin [facility]) 50 MG/ML oral solution 125 mg     Date Action Dose Route User    8/23/2021 2040 Given 125 mg Per G T

## 2021-08-24 NOTE — PLAN OF CARE
Pt alert and oriented x4, drowsy. VSS, afebrile. ENT at the bedside this evening. Pt c/o mild pain, pt wearing fentanyl patch per MAR. Denies SOB or nausea. Small amount of brownish colored sputum noted this shift.  Pt receiving tube feeding via PEG tube at

## 2021-08-24 NOTE — CM/SW NOTE
08/24/21 1600   CM/SW Referral Data   Referral Source    Reason for Referral Discharge planning   Informant Spouse/Significant Other   Patient Info   [facility]

## 2021-08-24 NOTE — PROGRESS NOTES
Pulmonary Progress Note        NAME: Alex Allison - [facility] - MRN: [id number] - Age: 78year old - : 1940        Last 24hrs: No events overnight, bleeding has slowed down but not stopped, Hgb dropped overnight    OBJECTIVE:   21  1246 cyanosis or edema    Labs reviewed as noted below        ASSESSMENT/PLAN:  1.  Hemoptysis  -chest x-ray unremarkable  -CT chest w/ faint GGOs noted in the left lung base  -ENT scoped pt last night, note/exam reviewed- no clear source of bleeding appreciated

## 2021-08-24 NOTE — CONSULTS
Iona Rosen is a 78year old male. Patient presents with:  Hemoptysis    HPI:    Patient was admitted to the emergency department. [place] yesterday for hemoptysis. He has recently been treated at [facility] for a squamous cell carcinoma of the tongue. anxiety. Skin: no skin lesions on scalp or face. Eyes:  PEERLA, EOMI, no spontaneous nystagmus  Musculoskeletal:  normocephalic. Facial strength symmetric/full. SCM symmetric, FROM neck.     Ear R: auricle nl; EAC clear; TM clear without evidence of flu and radiation therapy treatment has been rendered at [place], I feel strongly that he should be further evaluated there.

## 2021-08-24 NOTE — CONSULTS
[facility]  Report of Consultation    Liz Barefoot Patient Status:  Inpatient    1940 MRN [id number]   [facility] 4NW-A Attending Morena Hauser MD   Hosp Day # 1 PCP Antoine Hickey MD     Reason for Consultation:  CKD Stg • Heart Disease Neg       reports that he quit smoking about 33 years ago. He has a 50.00 pack-year smoking history. He has never used smokeless tobacco. He reports that he does not drink alcohol and does not use drugs.     Allergies:    Bactrim [Sulfame Q72H  •  thiamine (Vitamin B-1) tab 100 mg, 100 mg, Per G Tube, Daily  •  Vitamin D3 (Cholecalciferol) tab 2,000 Units, 2,000 Units, Per G Tube, Daily  •  Rebeca-Bee/C (ALBEE/C) tab TABS 1 tablet, 1 tablet, Oral, Daily  •  cefTRIAXone Sodium (ROCEPHIN) 1 g i BUN (mg/dL)   Date Value   08/24/2021 103 (H)   08/23/2021 102 (H)   06/03/2021 105 (H)   12/04/2013 19   02/25/2013 30 (H)   08/29/2012 13     CREATININE (mg/dL)   Date Value   12/04/2013 0.98   02/25/2013 1.4 (H)   08/29/2012 1.2     Creatinine

## 2021-08-24 NOTE — OCCUPATIONAL THERAPY NOTE
OCCUPATIONAL THERAPY EVALUATION - INPATIENT     Room Number: 403/403-A  Evaluation Date: 8/24/2021  Type of Evaluation: Initial  Presenting Problem: Hemoptysis    Physician Order: IP Consult to Occupational Therapy  Reason for Therapy: ADL/IADL Dysfunction throat feels raw. \"    Patient self-stated goal is to go home.      OBJECTIVE  Precautions: Bed/chair alarm;Hard of hearing (PEG tube)  Fall Risk: High fall risk    WEIGHT BEARING RESTRICTION  Weight Bearing Restriction: None                PAIN ASSESSMENT ASSESSMENT  Supine to Sit : Supervision  Sit to Stand: Contact guard assist    Skilled Therapy Provided: Patient educated on OT role, goals, plan of care, recommendations and safety. Patient noted to have mild tremors BUE at rest and during activity.   Victoria Maguire of medical or therapy record   Specific performance deficits impacting engagement in ADL/IADL MODERATE  3 - 5 performance deficits   Client Assessment/Performance Deficits MODERATE - Comorbidities and min to mod modifications of tasks    Clinical Decision

## 2021-08-24 NOTE — PROGRESS NOTES
[facility]    Progress Note    Rashi Betts Patient Status:  Inpatient    1940 MRN [id number]   [facility] 4NW-A Attending Colletta Carrie, MD   Hosp Day # 1 PCP Monica Oseguera MD       SUBJECTIVE:  No cough    no more bloo Or  dextrose 50 % injection 50 mL, 50 mL, Intravenous, Q15 Min PRN   Or  glucose (DEX4) oral liquid 30 g, 30 g, Oral, Q15 Min PRN   Or  glucose-vitamin C (DEX-4) chewable tab 8 tablet, 8 tablet, Oral, Q15 Min PRN  Insulin Aspart Pen (NOVOLOG) 100 UNIT/ML f Hyperglycemia     Acute kidney injury ([facility] [address]     Dehydration     Gastroenteritis     Abdominal pain     Pilonidal cyst with abscess     Anemia, unspecified type     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

## 2021-08-24 NOTE — PHYSICAL THERAPY NOTE
PHYSICAL THERAPY EVALUATION - INPATIENT     Room Number: 403/403-A  Evaluation Date: 8/24/2021  Type of Evaluation: Initial  Physician Order: PT Eval and Treat    Presenting Problem: hemoptysis  Reason for Therapy: Mobility Dysfunction and Discharge COGNITION  · Overall Cognitive Status:  WFL - within functional limits  · Following Commands:  follows multistep commands consistently  · Safety Judgement:  good awareness of safety precautions    RANGE OF MOTION AND STRENGTH ASSESSMENT  Upper extremit importance of mobility. Pt speaking little during session, reporting throat pain, very pleasant and able to follow all commands. Pt sat up eob with supervision, good sitting balance with no dizziness.   Pt stood with cga, initially began ambulating sans d with home health PT    PLAN  PT Treatment Plan: Endurance; Energy conservation;Patient education;Strengthening;Transfer training  Rehab Potential : Good  Frequency (Obs): 3-5x/week  Number of Visits to Meet Established Goals: 3      CURRENT GOALS    Goal #1

## 2021-08-24 NOTE — PROGRESS NOTES
Edith Alcala is a 78year old male. Patient presents with:  Hemoptysis    HPI:    Still sleepy today but more conversant. No active bleeding. No complaints of hemoptysis from the patient or from nursing.       PHYSICAL EXAM:    oral cavity: No eviden

## 2021-08-25 VITALS
WEIGHT: 155.88 LBS | TEMPERATURE: 98 F | OXYGEN SATURATION: 100 % | SYSTOLIC BLOOD PRESSURE: 176 MMHG | HEIGHT: 69 IN | RESPIRATION RATE: 16 BRPM | HEART RATE: 91 BPM | DIASTOLIC BLOOD PRESSURE: 67 MMHG | BODY MASS INDEX: 23.09 KG/M2

## 2021-08-25 LAB
ALBUMIN SERPL-MCNC: 2.8 G/DL (ref 3.4–5)
ALBUMIN/GLOB SERPL: 0.6 {RATIO} (ref 1–2)
ALP LIVER SERPL-CCNC: 39 U/L
ALT SERPL-CCNC: 25 U/L
ANA SCREEN: NEGATIVE
ANION GAP SERPL CALC-SCNC: 6 MMOL/L (ref 0–18)
AST SERPL-CCNC: 24 U/L (ref 15–37)
BASOPHILS # BLD AUTO: 0.02 X10(3) UL (ref 0–0.2)
BASOPHILS NFR BLD AUTO: 0.2 %
BASOPHILS NFR BRONCH: 0 %
BILIRUB SERPL-MCNC: 0.2 MG/DL (ref 0.1–2)
BLOOD TYPE BARCODE: 5100
BLOOD TYPE BARCODE: 5100
BUN BLD-MCNC: 81 MG/DL (ref 7–18)
CALCIUM BLD-MCNC: 10 MG/DL (ref 8.5–10.1)
CHLORIDE SERPL-SCNC: 109 MMOL/L (ref 98–112)
CO2 SERPL-SCNC: 28 MMOL/L (ref 21–32)
COLOR FLD: COLORLESS
CREAT BLD-MCNC: 3.14 MG/DL
DEPRECATED HBV CORE AB SER IA-ACNC: 197.1 NG/ML
EOSINOPHIL # BLD AUTO: 0.16 X10(3) UL (ref 0–0.7)
EOSINOPHIL NFR BLD AUTO: 1.4 %
EOSINOPHIL NFR BRONCH: 0 %
ERYTHROCYTE [DISTWIDTH] IN BLOOD BY AUTOMATED COUNT: 18 %
GLOBULIN PLAS-MCNC: 4.5 G/DL (ref 2.8–4.4)
GLUCOSE BLD-MCNC: 111 MG/DL (ref 70–99)
GLUCOSE BLD-MCNC: 76 MG/DL (ref 70–99)
GLUCOSE BLD-MCNC: 84 MG/DL (ref 70–99)
HCT VFR BLD AUTO: 29.3 %
HGB BLD-MCNC: 9.1 G/DL
HGB RETIC QN AUTO: 33.5 PG (ref 28.2–36.6)
HGB RETIC QN AUTO: 34.1 PG (ref 28.2–36.6)
IMM GRANULOCYTES # BLD AUTO: 0.04 X10(3) UL (ref 0–1)
IMM GRANULOCYTES NFR BLD: 0.4 %
IMM RETICS NFR: 0.21 RATIO (ref 0.1–0.3)
IMM RETICS NFR: 0.27 RATIO (ref 0.1–0.3)
IRON SATURATION: 10 %
IRON SERPL-MCNC: 34 UG/DL
LYMPHOCYTES # BLD AUTO: 0.33 X10(3) UL (ref 1–4)
LYMPHOCYTES NFR BLD AUTO: 3 %
LYMPHOCYTES NFR BRONCH: 8 %
M PROTEIN MFR SERPL ELPH: 7.3 G/DL (ref 6.4–8.2)
MCH RBC QN AUTO: 29.7 PG (ref 26–34)
MCHC RBC AUTO-ENTMCNC: 31.1 G/DL (ref 31–37)
MCV RBC AUTO: 95.8 FL
MONOCYTES # BLD AUTO: 0.91 X10(3) UL (ref 0.1–1)
MONOCYTES NFR BLD AUTO: 8.2 %
MONOS+MACROS NFR BRONCH: 57 %
NEUTROPHILS # BLD AUTO: 9.69 X10 (3) UL (ref 1.5–7.7)
NEUTROPHILS # BLD AUTO: 9.69 X10(3) UL (ref 1.5–7.7)
NEUTROPHILS NFR BLD AUTO: 86.8 %
NEUTROPHILS NFR BRONCH: 35 %
OSMOLALITY SERPL CALC.SUM OF ELEC: 320 MOSM/KG (ref 275–295)
PLATELET # BLD AUTO: 247 10(3)UL (ref 150–450)
POTASSIUM SERPL-SCNC: 4.2 MMOL/L (ref 3.5–5.1)
RBC # BLD AUTO: 3.06 X10(6)UL
RBC # FLD: 145 /MM3
RBC BRONCH FOR MAN CT: 607 /MM3
RETICS # AUTO: 68.2 X10(3) UL (ref 22.5–147.5)
RETICS # AUTO: 75.1 X10(3) UL (ref 22.5–147.5)
RETICS/RBC NFR AUTO: 2.3 %
RETICS/RBC NFR AUTO: 3.3 %
SODIUM SERPL-SCNC: 143 MMOL/L (ref 136–145)
TOTAL CELLS COUNTED: 100
TOTAL IRON BINDING CAPACITY: 356 UG/DL (ref 240–450)
TRANSFERRIN SERPL-MCNC: 239 MG/DL (ref 200–360)
VIT B12 SERPL-MCNC: 1259 PG/ML (ref 193–986)
WBC # BLD AUTO: 11.2 X10(3) UL (ref 4–11)

## 2021-08-25 PROCEDURE — 0B9J8ZX DRAINAGE OF LEFT LOWER LUNG LOBE, VIA NATURAL OR ARTIFICIAL OPENING ENDOSCOPIC, DIAGNOSTIC: ICD-10-PCS | Performed by: INTERNAL MEDICINE

## 2021-08-25 PROCEDURE — 99232 SBSQ HOSP IP/OBS MODERATE 35: CPT | Performed by: INTERNAL MEDICINE

## 2021-08-25 PROCEDURE — 99223 1ST HOSP IP/OBS HIGH 75: CPT | Performed by: SPECIALIST

## 2021-08-25 RX ORDER — MIDAZOLAM HYDROCHLORIDE 5 MG/ML
INJECTION INTRAMUSCULAR; INTRAVENOUS
Status: DISCONTINUED | OUTPATIENT
Start: 2021-08-25 | End: 2021-08-25 | Stop reason: HOSPADM

## 2021-08-25 RX ORDER — LIDOCAINE HYDROCHLORIDE 20 MG/ML
INJECTION, SOLUTION EPIDURAL; INFILTRATION; INTRACAUDAL; PERINEURAL
Status: DISCONTINUED | OUTPATIENT
Start: 2021-08-25 | End: 2021-08-25 | Stop reason: HOSPADM

## 2021-08-25 NOTE — PLAN OF CARE
Pt resting Aox4, VSS, O2 WNL on room air. Intermittent productive cough w/small amounts brown sputum. TF held per note at 11pm.  Denies nausea, mouth pain improved with medication administration. Updated on plan of care & verbalized understanding.   Safet Administer supportive blood products/factors as ordered and appropriate  Outcome: Progressing

## 2021-08-25 NOTE — PLAN OF CARE
Back from bronchoscopy. Awake & alert,slightly drowsy. Vital signs stable. NPO maintained until 2H after procedure. Comfortably resting in bed. Will continue to monitor.

## 2021-08-25 NOTE — OPERATIVE REPORT
Bronchoscopy procedure report    Preop diagnosis: Abnl CT, hemoptysis  Postop diagnosis:  Abnl CT, hemoptysis  Procedure performed: Bronchoalveolar lavage, BAL    Sedation used: 2 mg of versed, 50 mcg of fentanyl, topical lidocaine  Moderate Sedation Time:

## 2021-08-25 NOTE — CM/SW NOTE
08/25/21 1600   Discharge disposition   Expected discharge disposition Home-Health   Post Acute Care Provider Residential   Discharge transportation Private car

## 2021-08-25 NOTE — PROGRESS NOTES
Pulmonary Progress Note        NAME: Teo Serna - ROOM: 403/403-A - MRN: [id number] - Age: 78year old - : 1940        Last 24hrs: No events overnight, pt notes some dry mouth and feels like his prostate is acting up, feels the urge to void freque rhythm  Abdomen: soft, non-tender; bowel sounds normal; no masses,  no organomegaly  Extremities: extremities normal, atraumatic, no cyanosis or edema    Labs reviewed as noted below        ASSESSMENT/PLAN:  1.  Hemoptysis  -chest x-ray unremarkable  -CT ch

## 2021-08-25 NOTE — CONSULTS
Hem/Onc Report of Consultation    Patient Name: Javon Diop   YOB: 1940   Medical Record Number: [id number]   Consulting Physician: Dr. Thony Chiang  Referring Provider(s): Dr. Elby Brittle    Date of Consultation: 8/25/2021     Reason for Consultation 7.4  8/5/2020- 8.5  4/19/2021- 8.7  5/12/2021- 7.8  5/18/2021- 7.0  6/9/2021- 6.9  6/23/2021- 8.8  7/14/2021- 8.6  8/18/2021- 8.0  8/22/2021- 7.6    Past Medical History:  Past Medical History:   Diagnosis Date   • Anemia    • Cancer ([facility] [address]    • Diabetes me file    Social Determinants of Health  Financial Resource Strain:       Difficulty of Paying Living Expenses:   Food Insecurity:       Worried About Running Out of Food in the Last Year:       Ran Out of Food in the Last Year:   Transportation Needs:       Tube, Daily PRN  fentaNYL (DURAGESIC) 25 MCG/HR 1 patch, 1 patch, Transdermal, Q72H  ferrous sulfate 300 (60 Fe) MG/5ML syrup 300 mg, 300 mg, Per G Tube, Daily with breakfast  HYDROcodone-acetaminophen 7.5-325 MG/15ML solution 15 mL, 15 mL, Per G Tube, Q6H (65 FE) MG Oral Tab EC, Take 325 mg by mouth daily with breakfast.  docusate sodium 100 MG Oral Cap, Take 100 mg by mouth daily as needed for constipation. multivitamin with minerals Oral Tab, Take 1 tablet by mouth daily.   metoprolol Tartrate 25 MG Oral Value Ref Range    Rabia Screen Negative Negative   Beta 2 Glycoprotein I AB, G/M    Collection Time: 08/23/21  4:44 PM   Result Value Ref Range    Beta 2 Glycoprotein 1 Ab, IgG 1.3 <7 U/mL    Beta 2 Glycoprotein 1 Ab, IgM <2.9 <7 U/mL   Anticardiolipin AB, Calcium, Total 10.6 (H) 8.5 - 10.1 mg/dL    Calculated Osmolality 327 (H) 275 - 295 mOsm/kg    GFR, Non- 14 (L) >=60    GFR, -American 16 (L) >=60    AST 23 15 - 37 U/L    ALT 23 16 - 61 U/L    Alkaline Phosphatase 45 45 - 117 U/L Result Value Ref Range    Antibody Screen Negative    POCT Glucose    Collection Time: 08/24/21 12:31 PM   Result Value Ref Range    POC Glucose 183 (H) 70 - 99 mg/dL   Urinalysis, Routine    Collection Time: 08/24/21  1:41 PM   Result Value Ref Range Range    Glucose 84 70 - 99 mg/dL    Sodium 143 136 - 145 mmol/L    Potassium 4.2 3.5 - 5.1 mmol/L    Chloride 109 98 - 112 mmol/L    CO2 28.0 21.0 - 32.0 mmol/L    Anion Gap 6 0 - 18 mmol/L    BUN 81 (H) 7 - 18 mg/dL    Creatinine 3.14 (H) 0.70 - 1.30 mg/ Colorless     Body Fluid Clarity Cloudy     WBC Bronchial 145 /mm3    RBC Bronchial 607 /mm3   Reticulocyte Count    Collection Time: 08/25/21  8:56 AM   Result Value Ref Range    Retic% 2.3 0.5 - 2.5 %    Retic Absolute 75.1 22.5 - 147.5 x10(3) uL    Reti ordered. Transfuse for hemoglobin < 7.0 g/dl. 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 Hemoptysis: ENT was consulted-flex endoscopy 8/23/2021 did not reveal signs of bleeding.  Pulmonary was consulted with bronchosc

## 2021-08-25 NOTE — CONSULTS
[facility]                       [facility]enter[facility] Consultation[facility]    Leandro Olivo Patient Status:  Inpatient    1940 MRN [id number]   [facility] 4NW-A Attending Abiel Parker MD   Hosp Day # 2 PCP [place] Continuous  [COMPLETED] ondansetron (ZOFRAN) injection 4 mg, 4 mg, Intravenous, Once  [] ondansetron (ZOFRAN) injection 4 mg, 4 mg, Intravenous, Q4H PRN  glucose (DEX4) oral liquid 15 g, 15 g, Oral, Q15 Min PRN   Or  glucose-vitamin C (DEX-4) chewab mg, 125 mg, Per G Tube, Daily        Allergies:   Bactrim [Sulfametho*    SWELLING    Comment:Patient states that he does not think he is             allergic to this     Unable to complete     PE: /54   Pulse 81   Temp 98.2 °F (36.8 °C) (Oral)   Res bleeding (1 gastric/1 duodenal) all tx with APC  ________________________________________________________________________________________    Impression: 78year old male with PMhx that includes HTN, DM, KALA c/b gastric + SB AVMs, and tongue cancer s/p chem

## 2021-08-25 NOTE — HOME CARE LIAISON
Referral received via [facility] from [address] [place] [address]. Called and spoke with patient and wife Gabby Marks to discuss Residential home health and provide choice. [facility]. Patient choice is [facility]. Patient agreeable to home PT.  A

## 2021-08-25 NOTE — PLAN OF CARE
Dr Quintin Gonzalez came to see patient for consult but patient told him he doesn't want anything to do with GI service. He just wants to go home. Patient claims a doctor told him he can go.  It was explained to patient by RN that there are other specialist who needs

## 2021-08-25 NOTE — PROGRESS NOTES
[facility]  Progress Note    Gurdeep Nichols Patient Status:  Inpatient    1940 MRN [id number]   [facility] 4NW-A Attending Yahaira Coronado MD   Hosp Day # 2  Megan Stinson MD         SUBJECTIVE:  Subjective:  Gurdeep Nichols Lab 08/23/21 0217 08/24/21  0700 08/25/21  0641    140 143   K 4.7 4.5 4.2    106 109   CO2 30.0 29.0 28.0   * 103* 81*   CREATSERUM 3.87* 3.80* 3.14*   CA 11.1* 10.6* 10.0   * 183* 84       Recent Labs   Lab 08/23/21 0217 08/ retained contrast in small diverticula of the colon. OTHER:  Negative. No abnormal gaseous collections. CONCLUSION:  Contrast injected into the gastrostomy tube flows into the stomach.    Dictated by (CST): Alan Luong MD on 8/06/2021 at 12:5 critical result was discussed with Dr. Lencho Acuña at 3491 hours on 8/23/2021. Read back was performed.     Dictated by (CST): Cayla Espinosa MD on 8/23/2021 at 8:36 AM     Finalized by (CST): Cayla Espinosa MD on 8/23/2021 at 8:43 AM       XR CHEST AP PO abnormal     Chronic renal impairment     Elevated PSA     Personal history of colonic polyps     Low TSH level     Type 2 diabetes mellitus without complication, without long-term current use of insulin (HCC)     Pancreatic mass     Hyperkalemia     Hyper during this admission  Per ENT notes–Patient's history is significant for a [id number] right base of tongue  squamous cell carcinoma, s/p primary concurrent RT (70Gy in 35fx's) + weekly carboplatin chemotherapy, (AUC 2), (not cisplatin given his underl

## 2021-08-25 NOTE — PROGRESS NOTES
[facility]  Progress Note    Mary Mcbride Patient Status:  Inpatient    1940 MRN [id number]   [facility] 4NW-A Attending Marni Patel MD   Hosp Day # 2 PCP Anabel Clifford MD     No acute events overnight  S/p bronchoscop Rebeca-Bee/C (ALBEE/C) tab TABS 1 tablet, 1 tablet, Oral, Daily  •  cefTRIAXone Sodium (ROCEPHIN) 1 g in sodium chloride 0.9% 100 mL IVPB-ADDV, 1 g, Intravenous, Q24H  •  azithromycin (ZITHROMAX) tab 250 mg, 250 mg, Oral, Daily  •  vancomycin (FIRVANQ) 50 MG kidneys - c/w medical renal disease. No additional w/u  Cr stable  - monitor labs  2. Anemia /Hemoptysis - suspected related to H&N Ca related etiology, but no active bleeding noted on the flex endoscopy done per ENT  and bronch per pulm service.    3. DM

## 2021-08-25 NOTE — CONSULTS
Hem/Onc Report of Consultation    Patient Name: Ellie Kirkpatrick   YOB: 1940   Medical Record Number: [id number]   CSN: [id number]   Consulting Physician: Dr. Olivier Xiao  Referring Provider(s): Dr. Hernandez Tucker  Date of Consultation: 8/25/2021     Reason fo results:    5/29/2019-10.3  2/17/2020- 7.4  8/5/2020- 8.5  4/19/2021- 8.7  5/12/2021- 7.8  5/18/2021- 7.0  6/9/2021- 6.9  6/23/2021- 8.8  7/14/2021- 8.6  8/18/2021- 8.0  8/22/2021- 7.6    Past Medical History:  Past Medical History:   Diagnosis Date   • An Social History Narrative      Not on file    Social Determinants of Health  Financial Resource Strain:       Difficulty of Paying Living Expenses:   Food Insecurity:       Worried About Running Out of Food in the Last Year:       Ran Out of Food in the [place] sodium (COLACE) cap 100 mg, 100 mg, Peg Tube, Daily PRN  fentaNYL (DURAGESIC) 25 MCG/HR 1 patch, 1 patch, Transdermal, Q72H  ferrous sulfate 300 (60 Fe) MG/5ML syrup 300 mg, 300 mg, Per G Tube, Daily with breakfast  HYDROcodone-acetaminophen 7.5-325 MG/15M skin TID CC and HS. ferrous sulfate 325 (65 FE) MG Oral Tab EC, Take 325 mg by mouth daily with breakfast.  docusate sodium 100 MG Oral Cap, Take 100 mg by mouth daily as needed for constipation.   multivitamin with minerals Oral Tab, Take 1 tablet by mo cervical or supraclavicular regions.   Psych/Depression: mood and affect are appropriate    Labs:  Recent Results (from the past 24 hour(s))   ABORH (Blood Type)    Collection Time: 08/24/21  9:54 AM   Result Value Ref Range    ABO BLOOD TYPE O     RH BLOOD UNIT ABO O     UNIT RH POS     Product Status Presumed Transfused     Expiration Date 314080386732     Blood Type Barcode 5100    POCT Glucose    Collection Time: 08/25/21  5:06 AM   Result Value Ref Range    POC Glucose 76 70 - 99 mg/dL   Comp Metaboli 08/25/21  7:33 AM   Result Value Ref Range    Neutrophils Bronchial 35 %    Lymphocyte Bronchial 8 %    Mono/Macrophage Bronchial 57 %    Eosinophil Bronchial 0 %    Basophil Bronchial 0 %    Total Cells Counted 100    Cell count, bronchial wash    Collect from [address] [place] [address] radiology. Dictated by (CST): Juanita Vasquez MD on 8/23/2021 at 7:17 AM     Finalized by (CST): Juanita Vasquez MD on 8/23/2021 at 7:17 AM       Impression/Plan    Anemia: acute on chronic anemia.  Prior to chemotherapy patient's hemogl

## 2021-08-26 LAB
MYELOPEROX ANTIBODIES, IGG: 1 AU/ML
SERINE PROTEASE3, IGG: 4 AU/ML

## 2021-08-26 NOTE — PAYOR COMM NOTE
Discharge Notification    Patient Name: Wyatt People: [address] [place] [address] #: [id number]  Authorization Number: [id number]  Admit Date/Time: 8/23/2021 1:47 AM  Discharge Date/Time: 8/25/2021 1:00 PM

## 2021-08-27 LAB — NON GYNE INTERPRETATION: NEGATIVE

## 2021-10-13 NOTE — SLP NOTE
Note patient NPO for procedure. Will hold and continue to follow.     Alexsandra Sifuentes Harris 87 CCC-SLP  Pager [phone] Patient ID: Curly Crawford is a 29 year old male.     Chief complaint: had concerns including Office Visit and Physical.    Patient here for annual physical. No acute concerns.     Review of Systems   Constitutional: Negative.    HENT: Negative.    Eyes: Negative.    Respiratory: Negative.    Cardiovascular: Negative.    Gastrointestinal: Negative.    Endocrine: Negative.    Genitourinary: Negative.    Musculoskeletal: Negative.    Skin: Negative.    Allergic/Immunologic: Negative.    Neurological: Negative.    Hematological: Negative.    Psychiatric/Behavioral: Negative.        ALLERGIES:  Patient has no known allergies.    Patient's past medical, surgical, social and family histories were reviewed and updated as appropriate.    Outpatient Medications Marked as Taking for the 10/13/21 encounter (Office Visit) with Storm Bustillos DO   Medication Sig Dispense Refill   • lisinopril (ZESTRIL) 20 MG tablet TAKE 1 TABLET BY MOUTH DAILY 90 tablet 0   • allopurinol (ZYLOPRIM) 100 MG tablet TAKE 2 TABLETS BY MOUTH DAILY 180 tablet 0       Vitals:  Blood pressure 130/88, pulse 88, temperature 98.5 °F (36.9 °C), temperature source Temporal, height 5' 11\" (1.803 m), weight 132.6 kg (292 lb 6.4 oz). Body mass index is 40.78 kg/m².    Physical Exam  Vitals and nursing note reviewed.   Constitutional:       General: He is not in acute distress.     Appearance: Normal appearance. He is well-developed. He is obese. He is not ill-appearing.   HENT:      Head: Normocephalic and atraumatic.      Right Ear: External ear normal.      Left Ear: External ear normal.      Nose: Nose normal.   Eyes:      Extraocular Movements: Extraocular movements intact.      Conjunctiva/sclera: Conjunctivae normal.      Pupils: Pupils are equal, round, and reactive to light.   Cardiovascular:      Rate and Rhythm: Normal rate and regular rhythm.      Heart sounds: Normal heart sounds. No murmur heard.     Pulmonary:      Effort: Pulmonary effort is normal.       Breath sounds: Normal breath sounds.   Abdominal:      General: Abdomen is flat. Bowel sounds are normal. There is no distension.      Palpations: Abdomen is soft.      Tenderness: There is no abdominal tenderness.   Musculoskeletal:         General: Normal range of motion.      Cervical back: Normal range of motion and neck supple.   Skin:     General: Skin is warm and dry.   Neurological:      General: No focal deficit present.      Mental Status: He is alert and oriented to person, place, and time.      Cranial Nerves: No cranial nerve deficit.      Deep Tendon Reflexes: Reflexes normal.   Psychiatric:         Mood and Affect: Mood normal.         Behavior: Behavior normal.         Thought Content: Thought content normal.         Judgment: Judgment normal.         Problem List Items Addressed This Visit        Endocrine and Metabolic    Class 3 severe obesity due to excess calories without serious comorbidity with body mass index (BMI) of 40.0 to 44.9 in adult (CMS/[facility])       Health Encounters    Annual physical exam - Primary    Relevant Orders    CBC WITH DIFFERENTIAL    COMPREHENSIVE METABOLIC PANEL    LIPID PANEL WITH REFLEX    THYROID STIMULATING HORMONE REFLEX       Musculoskeletal and Injuries    Chronic gout without tophus    Relevant Orders    URIC ACID          Follow up: Return in about 1 year (around 10/13/2022) for Annual physical.    Discussed medication dosage, usage, goals of therapy, and side effects.    The patient indicates understanding of these issues and agrees with the plan.    Storm Bustillos, DO

## 2021-10-18 ENCOUNTER — IMMUNIZATION (OUTPATIENT)
Dept: LAB | Facility: HOSPITAL | Age: 81
End: 2021-10-18
Attending: EMERGENCY MEDICINE
Payer: MEDICARE

## 2021-10-18 ENCOUNTER — IMMUNIZATION (OUTPATIENT)
Dept: FAMILY MEDICINE CLINIC | Facility: CLINIC | Age: 81
End: 2021-10-18
Payer: COMMERCIAL

## 2021-10-18 DIAGNOSIS — Z23 NEED FOR VACCINATION: Primary | ICD-10-CM

## 2021-10-18 DIAGNOSIS — Z23 INFLUENZA VACCINE ADMINISTERED: Primary | ICD-10-CM

## 2021-10-18 PROCEDURE — G0008 ADMIN INFLUENZA VIRUS VAC: HCPCS | Performed by: PHYSICIAN ASSISTANT

## 2021-10-18 PROCEDURE — 90662 IIV NO PRSV INCREASED AG IM: CPT | Performed by: PHYSICIAN ASSISTANT

## 2021-10-18 PROCEDURE — 0003A SARSCOV2 VAC 30MCG/0.3ML IM: CPT

## 2021-10-20 NOTE — DISCHARGE SUMMARY
[facility]  Discharge Summary    Rilla Cogan Patient Status:  Inpatient    1940 MRN [id number]   [facility] 4NW-A Attending No att. providers found   Hosp Day # 2 PCP Blaine Arreola MD     Date of Admission: 2021    Date Gross per 24 hour   Intake 1550 ml   Output 1375 ml   Net 175 ml         Exam  General Appearance:    Alert, cooperative, no distress, appears stated age   Head:    Normocephalic,  atraumatic   Neck:   Supple, symmetrical, trachea midline,        thyroid obtained. PATIENT STATED HISTORY: (As transcribed by Technologist)  Patient offered no additional history at this time. FINDINGS:  An enteric tube is noted within the stomach. Injection of contrast confirms intraluminal positioning.   No extravasation NRDR ([address] [place] [address] Rd) which includes the Dose  Index Registry.   PATIENT STATED HISTORY: (As transcribed by Technologist)  Per son-in-law patient has been coughing up blood since 630PM.    FINDINGS:  LUNGS:  Several bullae in the right taye Lynn Oppenheim, MD on 8/23/2021 at 7:17 AM     Finalized by (CST): Lynn Oppenheim, MD on 8/23/2021 at 7:17 AM              Meds:      • Insulin Aspart Pen  1-5 Units Subcutaneous TID CC and HS   • fentaNYL  1 patch Transdermal Q72H   • ferrous sulfat unremarkable, CT chest reviewed, pulmonary consult reviewed, ENT scoped patient, note/exam reviewed no clear source of bleeding appreciated, s/p bronchoscopy   Preop diagnosis:            Abnl CT, hemoptysis  Postop diagnosis:          Abnl CT, hemoptysis hemoptysis     See tests ordered,  Available and radiology reviewed  All consultant notes reviewed  Discussed with nursing on floor either through nursing notes reviewed and/or discussion on phone/in person  dvt prophylaxis reviewed  PT and/or OT Solution Pen-injector  Inject 16 Units into the skin daily. , Historical    Memantine HCl 10 MG Oral Tab  Take 10 mg by mouth daily. , Historical    VITAMIN D OR  Take 2,000 Units by mouth daily. , Historical          Follow up Visits:  Follow-up with all

## 2022-01-01 ENCOUNTER — HOSPITAL ENCOUNTER (EMERGENCY)
Facility: HOSPITAL | Age: 82
Discharge: HOME | End: 2022-01-01
Attending: EMERGENCY MEDICINE
Payer: OTHER MISCELLANEOUS

## 2022-01-01 ENCOUNTER — APPOINTMENT (OUTPATIENT)
Dept: GENERAL RADIOLOGY | Facility: HOSPITAL | Age: 82
End: 2022-01-01
Attending: EMERGENCY MEDICINE
Payer: OTHER MISCELLANEOUS

## 2022-01-01 VITALS
OXYGEN SATURATION: 99 % | DIASTOLIC BLOOD PRESSURE: 61 MMHG | HEART RATE: 86 BPM | WEIGHT: 155 LBS | TEMPERATURE: 98 F | SYSTOLIC BLOOD PRESSURE: 175 MMHG | BODY MASS INDEX: 23 KG/M2 | RESPIRATION RATE: 18 BRPM

## 2022-01-01 DIAGNOSIS — K94.23 GASTROSTOMY TUBE DYSFUNCTION (HCC): Primary | ICD-10-CM

## 2022-01-01 PROCEDURE — 99283 EMERGENCY DEPT VISIT LOW MDM: CPT

## 2022-01-01 PROCEDURE — 43762 RPLC GTUBE NO REVJ TRC: CPT

## 2022-01-01 PROCEDURE — 74018 RADEX ABDOMEN 1 VIEW: CPT | Performed by: EMERGENCY MEDICINE

## (undated) DEVICE — FILTERLINE NASAL ADULT O2/CO2

## (undated) DEVICE — AIRLIFE&#8482 MISTY MAX 10 NEBULIZER W 7 (2.1 M) CRUSH RESISTANT OXYGEN TUBING BAFFLED TEE ADAPTER, MOUTHPIECE: Brand: AIRLIFE

## (undated) DEVICE — SYRINGE 10ML SLIP TIP

## (undated) DEVICE — 3M™ RED DOT™ MONITORING ELECTRODE WITH FOAM TAPE AND STICKY GEL, 50/BAG, 20/CASE, 72/PLT 2570: Brand: RED DOT™

## (undated) DEVICE — ENDOSCOPY PACK UPPER: Brand: MEDLINE INDUSTRIES, INC.

## (undated) DEVICE — BOWLS UTILITY 16OZ

## (undated) DEVICE — Device: Brand: DEFENDO AIR/WATER/SUCTION AND BIOPSY VALVE

## (undated) DEVICE — MASK ISOLATION

## (undated) DEVICE — CLIP LGT 11MM OPEN 2.8MM 235CM

## (undated) DEVICE — 1200CC GUARDIAN II: Brand: GUARDIAN

## (undated) DEVICE — CLIP RESOLUTION 235CM

## (undated) DEVICE — SPECIMEN TRAP LUKI

## (undated) DEVICE — MEDI-VAC SUCTION HANDLE REGULAR CAPACITY: Brand: CARDINAL HEALTH

## (undated) DEVICE — MEDI-VAC NON-CONDUCTIVE SUCTION TUBING: Brand: CARDINAL HEALTH

## (undated) DEVICE — MASK ETCO2 PANORAMIC

## (undated) DEVICE — 60 ML SYRINGE REGULAR TIP: Brand: MONOJECT

## (undated) DEVICE — SINGLE USE BIOPSY VALVE MAJ-210: Brand: SINGLE USE BIOPSY VALVE (STERILE)

## (undated) DEVICE — FIAPC® PROBE W/ FILTER 2200 A OD 2.3MM/6.9FR; L 2.2M/7.2FT: Brand: ERBE

## (undated) DEVICE — SINGLE USE SUCTION VALVE MAJ-209: Brand: SINGLE USE SUCTION VALVE (STERILE)

## (undated) DEVICE — REM POLYHESIVE ADULT PATIENT RETURN ELECTRODE: Brand: VALLEYLAB

## (undated) NOTE — LETTER
Geeta Mckenzie [address] [facility] [address]  [facility], [facility]    Consent for Operation  Date: __________________    Time: _______________    1.  I authorize the performance upon Gurdeep Rim the following operation:    BRONCHOSCOPY with bronchioalveolar lav original videotape. [facility] will not be responsible for storage or maintenance of this tape. 6. For the purpose of advancing medical education, I consent to the admittance of observers to the Operating Room.     7. I authorize the use of any specime ___________________________    When the patient is a minor or mentally incompetent to give consent:  Signature of person authorized to consent for patient: ___________________________   Relationship to patient: _____________________________________________

## (undated) NOTE — LETTER
[address] [facility] FOR BLOOD OR BLOOD COMPONENTS      In the course of your treatment, it may become necessary to administer a transfusion of blood or blood components.  This form provides basic information concerning this proc alternatives to you if it has not already been done. I,Kt Lane, have read/had read to me the above. I understand the matters bearing on the decision whether or not to authorize a transfusion of blood or blood components.  I have no questions which ha

## (undated) NOTE — LETTER
Geeta Mckenzie 182 [address] [address]  [facility], [address] [facility]    Consent for Operation  Date: __________________                                Time: _______________    1.  I authorize the performance upon Rebekah Minor the following operation:  Procedure(s procedure has been videotaped, the surgeon will obtain the original videotape. The hospital will not be responsible for storage or maintenance of this tape.   7. For the purpose of advancing medical education, I consent to the admittance of observers to the STATEMENTS REQUIRING INSERTION OR COMPLETION WERE FILLED IN.     Signature of Patient:   ___________________________    When the patient is a minor or mentally incompetent to give consent:  Signature of person authorized to consent for patient: ____________ drugs/illegal medications). Failure to inform my anesthesiologist about these medicines may increase my risk of anesthetic complications. iv. If I am allergic to anything or have had a reaction to anesthesia before.   3. I understand how the anesthesia med I have discussed the procedure and information above with the patient (or patient’s representative) and answered their questions. The patient or their representative has agreed to have anesthesia services.     _______________________________________________

## (undated) NOTE — IP AVS SNAPSHOT
Patient Demographics     Address  [address] [place] [address] Phone  [phone] (Home)  [phone] (Mobile) *Preferred* E-mail Address  [email or web address]      Emergency Contact(s)     Name Relation Home Work Mobile    Kathy Lane Spouse [phone] SOLE Muñoz         cetirizine 10 MG Tabs  Commonly known as: ZYRTEC      Take 10 mg by mouth daily as needed. Chlorhexidine Gluconate 4 % Liqd  Commonly known as: HIBICLENS      Apply 30 mL topically daily as needed.    SOLE Tate ACTION REPORT  (last 24 hrs)    ** SITE UNKNOWN **     Order ID Medication Name Action Time Action Reason Comments    [id number] 0.9% NaCl infusion 05/18/21 0025 New Bag      [id number] 0.9% NaCl infusion 05/18/21 1054 New Bag      [id number] Insulin Regular (VANCOCIN) cap 125 mg 05/18/21 0857 Given            LEFT UPPER ABDOMEN     Order ID Medication Name Action Time Action Reason Comments    [id number] insulin detemir (LEVEMIR) 100 UNIT/ML flextouch 5 Units 05/18/21 0900 Given            RIGHT LOWER ABDOMEN Specimen Information    Type Source Collected On   Blood — 05/18/21 0626          Components    Component Value Reference Range Flag Lab   Glucose 105 70 - 99 mg/dL H [facility])   Sodium 138 136 - 145 mmol/L — [facility])   Potassium 5.6 3.5 - 5 mg/dL Ant Guerin [facility])   Sodium 133 136 - 145 mmol/L L Sasser [facility])   Potassium 6.9 3.5 - 5.1 mmol/L [facility])   Chloride 106 98 - 112 mmol/L — [facility] ([facility])   CO2 21.0 21.0 - 32.0 mmol/L — [facility] ([facility])   Anion Gap 6 0 - 18 mmol/L — [facility]) Christopher Arriaza  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 Patient has a G-tube.   Patient  states the current boil in his buttock area started 5 days back and had this drained at that time and was on [Sulfametho*    SWELLING    Comment:Patient states that he does not think he is             allergic to this    Home Medications:  (Not in a hospital admission)      Review of Systems:  A comprehensive 14 point review of systems was completed.   Pertinent p     TECHNIQUE:  Transabdominal gray scale ultrasound imaging of the bilateral kidneys and bladder was performed.  Routine technique was utilized.         PATIENT STATED HISTORY: (As transcribed by Technologist) Luis Carlos Myers has acute kidney infection.           Follow BMP in a.m.  8. Anemia of chronic kidney disease  1. Follow BMP in a.m.  9. Mild leukocytosis due to the recurrent pilonidal abscess  1. IV antibiotics  2. Follow CBC in a. m.[MJ.2]  10.  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 Patient has a G-tube. Patient  states the current boil in his buttock area started 5 days back and had this drained at that time and was on oral antibiotics.   Patient states he has had states that he does not think he is             allergic to this    Home Medications:  (Not in a hospital admission)      Review of Systems:  A comprehensive 14 point review of systems was completed. Pertinent positives and negatives noted in the the HPI. ultrasound imaging of the bilateral kidneys and bladder was performed.  Routine technique was utilized.         PATIENT STATED HISTORY: (As transcribed by Technologist) Blossom Chicas has acute kidney infection.            FINDINGS:        RIGHT KIDNEY   MEASURE kidney disease  1. Follow BMP in a.m.  9. Mild leukocytosis due to the recurrent pilonidal abscess  1. IV antibiotics  2. Follow CBC in a. m.[MJ.2]      Quality:  · DVT Prophylaxis:[MJ.1] SCD, subcutaneous heparin[MJ.2]  · CODE status:[MJ.1] full[MJ.2]  · F evaluation. He has had many boils in the past that required I+D. At the ER, the DINORA performed I+D of the pilonidal abscess again. The total surface area was approximately 3.5 inches in circumference.   Purulent drainage was noted and aerobic cultures we skin graft-bilateral arms       Family History   Problem Relation Age of Onset   • Diabetes Mother    • Hypertension Mother    • Diabetes Sister    • Sickle Cell Sister         Trait   • Diabetes Brother    • Stroke Brother    • Cancer Neg    • Heart Disea Tab  Take 10 mg by mouth daily as needed. Ipratropium Bromide 0.06 % Nasal Solution  2 sprays by Nasal route 4 (four) times daily. Cholecalciferol (VITAMIN D) 1000 units Oral Tab  Take 1,000 Int'l Units by mouth daily.     Cephalexin 500 MG Oral Tab x3.  Cooperative. No apparent distress. HEENT: Exam is unremarkable. Without scleral icterus. Neck: No tenderness to palpitation. Full range of motion to flexion and extension, lateral rotation and lateral flexion of cervical spine. No JVD. Supple. 78year old male who presented to the ER last night for I+D of a recurrent pilonidal abscess. The patient was incidentally found to be hyperkalemic with potassium of 6.9 and creatinine of 4.75.  He is currently undergoing chemo and radiation for squamous ce emergency room     1. Tomorrow, I would recommend removing the packing. We then have external dressing changes to absorb drainage. 2. Continue antibiotics  3. The patient may resume G-tube feeds and take oral intake, as he usually does.   4. Change the ex Patient seen for swallowing evaluation per protocol. Patient alert, quite pleasant and up in bed. He is agreeable to sit up for this assessment. He is rotated to the right due to soreness following I&D of a pilonidal abcess.    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   If you have any questions, please contact [phone] [place]

## (undated) NOTE — LETTER
[facility]  Amy Tayacandy 59 [address] [facility], [address] [place] [address]    Consent for Operation    Date: __________________    Time: _______________    1.  I authorize the performance upon Jana Crouch the following operation:     ESOPHAGOGASTRODUODENOSCOPY WITH POSS procedure has been videotaped, the surgeon will obtain the original videotape. The hospital will not be responsible for storage or maintenance of this tape.     6. For the purpose of advancing medical education, I consent to the admittance of observers to t STATEMENTS REQUIRING INSERTION OR COMPLETION WERE FILLED IN.     Signature of Patient:   ___________________________    When the patient is a minor or mentally incompetent to give consent:  Signature of person authorized to consent for patient: ____________ · If I am allergic to anything or have had a reaction to anesthesia before. 3. I understand how the anesthesia medicine will help me (benefits). 4. I understand that with any type of anesthesia medicine there are risks:  a.  The most common risks are: their representative has agreed to have anesthesia services.     _____________________________________________________________________________  Witness        Date   Time  I have verified that the signature is that of the patient or patient’s representative

## (undated) NOTE — LETTER
Date: 2020  Patient Name:  Iona Mongeeditabenton             Address: [address] [place] [address]    : 1940    Dear Iona Rosen,    You recently had an endoscopy done at [facility] with biopsies of the stomach.     The biopsies showed a

## (undated) NOTE — LETTER
Geeta Mckenzie 182 6 [address]  [place], [address] [place] [address]    Consent for Operation  Date: __________________                                Time: _______________    1.  I authorize the performance upon Alex Allison the following operation:  Procedure(s procedure has been videotaped, the surgeon will obtain the original videotape. The hospital will not be responsible for storage or maintenance of this tape.     6. For the purpose of advancing medical education, I consent to the admittance of observers to t STATEMENTS REQUIRING INSERTION OR COMPLETION WERE FILLED IN.     Signature of Patient:   ___________________________    When the patient is a minor or mentally incompetent to give consent:  Signature of person authorized to consent for patient: ____________